# Patient Record
Sex: MALE | Race: WHITE | HISPANIC OR LATINO | Employment: UNEMPLOYED | URBAN - NONMETROPOLITAN AREA
[De-identification: names, ages, dates, MRNs, and addresses within clinical notes are randomized per-mention and may not be internally consistent; named-entity substitution may affect disease eponyms.]

---

## 2024-01-15 ENCOUNTER — APPOINTMENT (OUTPATIENT)
Dept: CT IMAGING | Facility: HOSPITAL | Age: 54
End: 2024-01-15
Payer: MEDICAID

## 2024-01-15 ENCOUNTER — HOSPITAL ENCOUNTER (INPATIENT)
Facility: HOSPITAL | Age: 54
LOS: 2 days | Discharge: HOME | End: 2024-01-17
Attending: EMERGENCY MEDICINE | Admitting: INTERNAL MEDICINE
Payer: MEDICAID

## 2024-01-15 ENCOUNTER — APPOINTMENT (OUTPATIENT)
Dept: GENERAL RADIOLOGY | Facility: HOSPITAL | Age: 54
End: 2024-01-15
Payer: MEDICAID

## 2024-01-15 DIAGNOSIS — R41.89 COGNITIVE AND BEHAVIORAL CHANGES: ICD-10-CM

## 2024-01-15 DIAGNOSIS — R53.1 ACUTE RIGHT-SIDED WEAKNESS: Primary | ICD-10-CM

## 2024-01-15 DIAGNOSIS — R46.89 COGNITIVE AND BEHAVIORAL CHANGES: ICD-10-CM

## 2024-01-15 PROBLEM — I63.9 CVA (CEREBRAL VASCULAR ACCIDENT): Status: ACTIVE | Noted: 2024-01-15

## 2024-01-15 LAB
ALBUMIN SERPL-MCNC: 4.4 G/DL (ref 3.5–5.2)
ALBUMIN/GLOB SERPL: 1.4 G/DL
ALP SERPL-CCNC: 88 U/L (ref 39–117)
ALT SERPL W P-5'-P-CCNC: 15 U/L (ref 1–41)
AMPHET+METHAMPHET UR QL: POSITIVE
AMPHETAMINES UR QL: POSITIVE
ANION GAP SERPL CALCULATED.3IONS-SCNC: 9 MMOL/L (ref 5–15)
APTT PPP: 29.2 SECONDS (ref 24.5–36)
AST SERPL-CCNC: 20 U/L (ref 1–40)
BARBITURATES UR QL SCN: NEGATIVE
BASOPHILS # BLD AUTO: 0.05 10*3/MM3 (ref 0–0.2)
BASOPHILS NFR BLD AUTO: 0.8 % (ref 0–1.5)
BENZODIAZ UR QL SCN: NEGATIVE
BILIRUB SERPL-MCNC: 0.4 MG/DL (ref 0–1.2)
BILIRUB UR QL STRIP: NEGATIVE
BUN SERPL-MCNC: 9 MG/DL (ref 6–20)
BUN/CREAT SERPL: 12.5 (ref 7–25)
BUPRENORPHINE SERPL-MCNC: NEGATIVE NG/ML
CALCIUM SPEC-SCNC: 9.6 MG/DL (ref 8.6–10.5)
CANNABINOIDS SERPL QL: NEGATIVE
CHLORIDE SERPL-SCNC: 103 MMOL/L (ref 98–107)
CHOLEST SERPL-MCNC: 160 MG/DL (ref 0–200)
CLARITY UR: CLEAR
CO2 SERPL-SCNC: 30 MMOL/L (ref 22–29)
COCAINE UR QL: NEGATIVE
COLOR UR: YELLOW
CREAT SERPL-MCNC: 0.72 MG/DL (ref 0.76–1.27)
DEPRECATED RDW RBC AUTO: 43 FL (ref 37–54)
EGFRCR SERPLBLD CKD-EPI 2021: 109.2 ML/MIN/1.73
EOSINOPHIL # BLD AUTO: 0.12 10*3/MM3 (ref 0–0.4)
EOSINOPHIL NFR BLD AUTO: 1.8 % (ref 0.3–6.2)
ERYTHROCYTE [DISTWIDTH] IN BLOOD BY AUTOMATED COUNT: 12.8 % (ref 12.3–15.4)
ETHANOL UR QL: <0.01 %
FENTANYL UR-MCNC: NEGATIVE NG/ML
GLOBULIN UR ELPH-MCNC: 3.1 GM/DL
GLUCOSE SERPL-MCNC: 84 MG/DL (ref 65–99)
GLUCOSE UR STRIP-MCNC: NEGATIVE MG/DL
HCT VFR BLD AUTO: 50.7 % (ref 37.5–51)
HDLC SERPL-MCNC: 40 MG/DL (ref 40–60)
HGB BLD-MCNC: 16.7 G/DL (ref 13–17.7)
HGB UR QL STRIP.AUTO: NEGATIVE
IMM GRANULOCYTES # BLD AUTO: 0.01 10*3/MM3 (ref 0–0.05)
IMM GRANULOCYTES NFR BLD AUTO: 0.2 % (ref 0–0.5)
INR PPP: 0.92 (ref 0.91–1.09)
KETONES UR QL STRIP: NEGATIVE
LDLC SERPL CALC-MCNC: 95 MG/DL (ref 0–100)
LDLC/HDLC SERPL: 2.29 {RATIO}
LEUKOCYTE ESTERASE UR QL STRIP.AUTO: NEGATIVE
LIPASE SERPL-CCNC: 29 U/L (ref 13–60)
LYMPHOCYTES # BLD AUTO: 1.93 10*3/MM3 (ref 0.7–3.1)
LYMPHOCYTES NFR BLD AUTO: 29 % (ref 19.6–45.3)
MAGNESIUM SERPL-MCNC: 2 MG/DL (ref 1.6–2.6)
MCH RBC QN AUTO: 30 PG (ref 26.6–33)
MCHC RBC AUTO-ENTMCNC: 32.9 G/DL (ref 31.5–35.7)
MCV RBC AUTO: 91.2 FL (ref 79–97)
METHADONE UR QL SCN: NEGATIVE
MONOCYTES # BLD AUTO: 0.56 10*3/MM3 (ref 0.1–0.9)
MONOCYTES NFR BLD AUTO: 8.4 % (ref 5–12)
NEUTROPHILS NFR BLD AUTO: 3.98 10*3/MM3 (ref 1.7–7)
NEUTROPHILS NFR BLD AUTO: 59.8 % (ref 42.7–76)
NITRITE UR QL STRIP: NEGATIVE
NRBC BLD AUTO-RTO: 0 /100 WBC (ref 0–0.2)
OPIATES UR QL: NEGATIVE
OXYCODONE UR QL SCN: NEGATIVE
PCP UR QL SCN: NEGATIVE
PH UR STRIP.AUTO: 6.5 [PH] (ref 5–8)
PLATELET # BLD AUTO: 228 10*3/MM3 (ref 140–450)
PMV BLD AUTO: 10.8 FL (ref 6–12)
POTASSIUM SERPL-SCNC: 3.5 MMOL/L (ref 3.5–5.2)
PROT SERPL-MCNC: 7.5 G/DL (ref 6–8.5)
PROT UR QL STRIP: NEGATIVE
PROTHROMBIN TIME: 12.5 SECONDS (ref 11.8–14.8)
RBC # BLD AUTO: 5.56 10*6/MM3 (ref 4.14–5.8)
SODIUM SERPL-SCNC: 142 MMOL/L (ref 136–145)
SP GR UR STRIP: 1.02 (ref 1–1.03)
T4 FREE SERPL-MCNC: 1.34 NG/DL (ref 0.93–1.7)
TRICYCLICS UR QL SCN: NEGATIVE
TRIGL SERPL-MCNC: 143 MG/DL (ref 0–150)
TROPONIN T SERPL HS-MCNC: 9 NG/L
TSH SERPL DL<=0.05 MIU/L-ACNC: 0.82 UIU/ML (ref 0.27–4.2)
UROBILINOGEN UR QL STRIP: ABNORMAL
VLDLC SERPL-MCNC: 25 MG/DL (ref 5–40)
WBC NRBC COR # BLD AUTO: 6.65 10*3/MM3 (ref 3.4–10.8)

## 2024-01-15 PROCEDURE — 70450 CT HEAD/BRAIN W/O DYE: CPT

## 2024-01-15 PROCEDURE — 85610 PROTHROMBIN TIME: CPT | Performed by: EMERGENCY MEDICINE

## 2024-01-15 PROCEDURE — 81003 URINALYSIS AUTO W/O SCOPE: CPT | Performed by: EMERGENCY MEDICINE

## 2024-01-15 PROCEDURE — 82607 VITAMIN B-12: CPT | Performed by: EMERGENCY MEDICINE

## 2024-01-15 PROCEDURE — 80053 COMPREHEN METABOLIC PANEL: CPT | Performed by: EMERGENCY MEDICINE

## 2024-01-15 PROCEDURE — 25810000003 SODIUM CHLORIDE 0.9 % SOLUTION: Performed by: EMERGENCY MEDICINE

## 2024-01-15 PROCEDURE — 83735 ASSAY OF MAGNESIUM: CPT | Performed by: EMERGENCY MEDICINE

## 2024-01-15 PROCEDURE — 80307 DRUG TEST PRSMV CHEM ANLYZR: CPT | Performed by: EMERGENCY MEDICINE

## 2024-01-15 PROCEDURE — 85730 THROMBOPLASTIN TIME PARTIAL: CPT | Performed by: EMERGENCY MEDICINE

## 2024-01-15 PROCEDURE — 80061 LIPID PANEL: CPT | Performed by: EMERGENCY MEDICINE

## 2024-01-15 PROCEDURE — 84484 ASSAY OF TROPONIN QUANT: CPT | Performed by: EMERGENCY MEDICINE

## 2024-01-15 PROCEDURE — 82077 ASSAY SPEC XCP UR&BREATH IA: CPT | Performed by: EMERGENCY MEDICINE

## 2024-01-15 PROCEDURE — 85025 COMPLETE CBC W/AUTO DIFF WBC: CPT | Performed by: EMERGENCY MEDICINE

## 2024-01-15 PROCEDURE — 73502 X-RAY EXAM HIP UNI 2-3 VIEWS: CPT

## 2024-01-15 PROCEDURE — 83690 ASSAY OF LIPASE: CPT | Performed by: EMERGENCY MEDICINE

## 2024-01-15 PROCEDURE — 84439 ASSAY OF FREE THYROXINE: CPT | Performed by: EMERGENCY MEDICINE

## 2024-01-15 PROCEDURE — 72125 CT NECK SPINE W/O DYE: CPT

## 2024-01-15 PROCEDURE — 99285 EMERGENCY DEPT VISIT HI MDM: CPT

## 2024-01-15 PROCEDURE — 84443 ASSAY THYROID STIM HORMONE: CPT | Performed by: EMERGENCY MEDICINE

## 2024-01-15 RX ORDER — ATORVASTATIN CALCIUM 40 MG/1
80 TABLET, FILM COATED ORAL NIGHTLY
Status: DISCONTINUED | OUTPATIENT
Start: 2024-01-15 | End: 2024-01-17 | Stop reason: HOSPADM

## 2024-01-15 RX ORDER — SODIUM CHLORIDE 9 MG/ML
40 INJECTION, SOLUTION INTRAVENOUS AS NEEDED
Status: DISCONTINUED | OUTPATIENT
Start: 2024-01-15 | End: 2024-01-17 | Stop reason: HOSPADM

## 2024-01-15 RX ORDER — ASPIRIN 81 MG/1
324 TABLET, CHEWABLE ORAL ONCE
Status: COMPLETED | OUTPATIENT
Start: 2024-01-15 | End: 2024-01-15

## 2024-01-15 RX ORDER — ACETAMINOPHEN 325 MG/1
650 TABLET ORAL EVERY 6 HOURS PRN
Status: DISCONTINUED | OUTPATIENT
Start: 2024-01-15 | End: 2024-01-17 | Stop reason: HOSPADM

## 2024-01-15 RX ORDER — SODIUM CHLORIDE 0.9 % (FLUSH) 0.9 %
10 SYRINGE (ML) INJECTION AS NEEDED
Status: DISCONTINUED | OUTPATIENT
Start: 2024-01-15 | End: 2024-01-17 | Stop reason: HOSPADM

## 2024-01-15 RX ORDER — SODIUM CHLORIDE 0.9 % (FLUSH) 0.9 %
10 SYRINGE (ML) INJECTION EVERY 12 HOURS SCHEDULED
Status: DISCONTINUED | OUTPATIENT
Start: 2024-01-15 | End: 2024-01-17 | Stop reason: HOSPADM

## 2024-01-15 RX ORDER — ASPIRIN 300 MG/1
300 SUPPOSITORY RECTAL DAILY
Status: DISCONTINUED | OUTPATIENT
Start: 2024-01-16 | End: 2024-01-17

## 2024-01-15 RX ORDER — ASPIRIN 325 MG
325 TABLET ORAL DAILY
Status: DISCONTINUED | OUTPATIENT
Start: 2024-01-16 | End: 2024-01-17

## 2024-01-15 RX ORDER — ONDANSETRON 2 MG/ML
4 INJECTION INTRAMUSCULAR; INTRAVENOUS EVERY 6 HOURS PRN
Status: DISCONTINUED | OUTPATIENT
Start: 2024-01-15 | End: 2024-01-17 | Stop reason: HOSPADM

## 2024-01-15 RX ADMIN — SODIUM CHLORIDE 1000 ML: 9 INJECTION, SOLUTION INTRAVENOUS at 20:41

## 2024-01-15 RX ADMIN — ASPIRIN 324 MG: 81 TABLET, CHEWABLE ORAL at 21:35

## 2024-01-16 ENCOUNTER — APPOINTMENT (OUTPATIENT)
Dept: CARDIOLOGY | Facility: HOSPITAL | Age: 54
End: 2024-01-16
Payer: MEDICAID

## 2024-01-16 ENCOUNTER — APPOINTMENT (OUTPATIENT)
Dept: CT IMAGING | Facility: HOSPITAL | Age: 54
End: 2024-01-16
Payer: MEDICAID

## 2024-01-16 ENCOUNTER — APPOINTMENT (OUTPATIENT)
Dept: MRI IMAGING | Facility: HOSPITAL | Age: 54
End: 2024-01-16
Payer: MEDICAID

## 2024-01-16 PROBLEM — E78.5 HYPERLIPIDEMIA: Status: ACTIVE | Noted: 2024-01-16

## 2024-01-16 PROBLEM — Z78.9 ALCOHOL USE: Status: ACTIVE | Noted: 2024-01-16

## 2024-01-16 PROBLEM — F15.10 METHAMPHETAMINE USE: Status: ACTIVE | Noted: 2024-01-16

## 2024-01-16 PROBLEM — Z72.0 TOBACCO USE: Status: ACTIVE | Noted: 2024-01-16

## 2024-01-16 LAB
BH CV ECHO MEAS - AO MAX PG: 4.6 MMHG
BH CV ECHO MEAS - AO MEAN PG: 2.5 MMHG
BH CV ECHO MEAS - AO ROOT DIAM: 3.3 CM
BH CV ECHO MEAS - AO V2 MAX: 107 CM/SEC
BH CV ECHO MEAS - AO V2 VTI: 24.8 CM
BH CV ECHO MEAS - AVA(I,D): 2.6 CM2
BH CV ECHO MEAS - EDV(CUBED): 97.3 ML
BH CV ECHO MEAS - EDV(MOD-SP2): 100 ML
BH CV ECHO MEAS - EDV(MOD-SP4): 117 ML
BH CV ECHO MEAS - EF(MOD-BP): 54.2 %
BH CV ECHO MEAS - EF(MOD-SP2): 56.9 %
BH CV ECHO MEAS - EF(MOD-SP4): 51 %
BH CV ECHO MEAS - ESV(CUBED): 46.7 ML
BH CV ECHO MEAS - ESV(MOD-SP2): 43.1 ML
BH CV ECHO MEAS - ESV(MOD-SP4): 57.3 ML
BH CV ECHO MEAS - FS: 21.7 %
BH CV ECHO MEAS - IVS/LVPW: 1.18 CM
BH CV ECHO MEAS - IVSD: 1.3 CM
BH CV ECHO MEAS - LA DIMENSION: 3 CM
BH CV ECHO MEAS - LAT PEAK E' VEL: 6.6 CM/SEC
BH CV ECHO MEAS - LV DIASTOLIC VOL/BSA (35-75): 64.9 CM2
BH CV ECHO MEAS - LV MASS(C)D: 205 GRAMS
BH CV ECHO MEAS - LV MAX PG: 2 MMHG
BH CV ECHO MEAS - LV MEAN PG: 1 MMHG
BH CV ECHO MEAS - LV SYSTOLIC VOL/BSA (12-30): 31.8 CM2
BH CV ECHO MEAS - LV V1 MAX: 70.7 CM/SEC
BH CV ECHO MEAS - LV V1 VTI: 15.5 CM
BH CV ECHO MEAS - LVIDD: 4.6 CM
BH CV ECHO MEAS - LVIDS: 3.6 CM
BH CV ECHO MEAS - LVOT AREA: 4.2 CM2
BH CV ECHO MEAS - LVOT DIAM: 2.3 CM
BH CV ECHO MEAS - LVPWD: 1.1 CM
BH CV ECHO MEAS - MED PEAK E' VEL: 3.8 CM/SEC
BH CV ECHO MEAS - MV A MAX VEL: 89.4 CM/SEC
BH CV ECHO MEAS - MV DEC TIME: 0.35 SEC
BH CV ECHO MEAS - MV E MAX VEL: 63.7 CM/SEC
BH CV ECHO MEAS - MV E/A: 0.71
BH CV ECHO MEAS - PI END-D VEL: 94.6 CM/SEC
BH CV ECHO MEAS - SI(MOD-SP2): 31.6 ML/M2
BH CV ECHO MEAS - SI(MOD-SP4): 33.1 ML/M2
BH CV ECHO MEAS - SV(LVOT): 64.4 ML
BH CV ECHO MEAS - SV(MOD-SP2): 56.9 ML
BH CV ECHO MEAS - SV(MOD-SP4): 59.7 ML
BH CV ECHO MEASUREMENTS AVERAGE E/E' RATIO: 12.25
BH CV ECHO SHUNT ASSESSMENT PERFORMED (HIDDEN SCRIPTING): 1
GLUCOSE BLDC GLUCOMTR-MCNC: 130 MG/DL (ref 70–130)
GLUCOSE BLDC GLUCOMTR-MCNC: 198 MG/DL (ref 70–130)
GLUCOSE BLDC GLUCOMTR-MCNC: 342 MG/DL (ref 70–130)
GLUCOSE BLDC GLUCOMTR-MCNC: 77 MG/DL (ref 70–130)
GLUCOSE BLDC GLUCOMTR-MCNC: 90 MG/DL (ref 70–130)
HBA1C MFR BLD: 5.6 % (ref 4.8–5.6)
LEFT ATRIUM VOLUME INDEX: 18.8 ML/M2
LEFT ATRIUM VOLUME: 33.8 ML
VIT B12 BLD-MCNC: 507 PG/ML (ref 211–946)

## 2024-01-16 PROCEDURE — 93306 TTE W/DOPPLER COMPLETE: CPT | Performed by: EMERGENCY MEDICINE

## 2024-01-16 PROCEDURE — 70551 MRI BRAIN STEM W/O DYE: CPT

## 2024-01-16 PROCEDURE — 82948 REAGENT STRIP/BLOOD GLUCOSE: CPT

## 2024-01-16 PROCEDURE — 93306 TTE W/DOPPLER COMPLETE: CPT

## 2024-01-16 PROCEDURE — 70498 CT ANGIOGRAPHY NECK: CPT

## 2024-01-16 PROCEDURE — 83036 HEMOGLOBIN GLYCOSYLATED A1C: CPT | Performed by: INTERNAL MEDICINE

## 2024-01-16 PROCEDURE — 92523 SPEECH SOUND LANG COMPREHEN: CPT | Performed by: SPEECH-LANGUAGE PATHOLOGIST

## 2024-01-16 PROCEDURE — 25010000002 THIAMINE PER 100 MG: Performed by: EMERGENCY MEDICINE

## 2024-01-16 PROCEDURE — 99221 1ST HOSP IP/OBS SF/LOW 40: CPT | Performed by: CLINICAL NURSE SPECIALIST

## 2024-01-16 PROCEDURE — 25510000001 IOPAMIDOL PER 1 ML: Performed by: INTERNAL MEDICINE

## 2024-01-16 PROCEDURE — 70496 CT ANGIOGRAPHY HEAD: CPT

## 2024-01-16 RX ORDER — LOSARTAN POTASSIUM 50 MG/1
25 TABLET ORAL
Status: DISCONTINUED | OUTPATIENT
Start: 2024-01-16 | End: 2024-01-17 | Stop reason: HOSPADM

## 2024-01-16 RX ADMIN — ASPIRIN 325 MG: 325 TABLET, FILM COATED ORAL at 09:17

## 2024-01-16 RX ADMIN — Medication 10 ML: at 09:17

## 2024-01-16 RX ADMIN — THIAMINE HYDROCHLORIDE 100 MG: 100 INJECTION, SOLUTION INTRAMUSCULAR; INTRAVENOUS at 09:17

## 2024-01-16 RX ADMIN — Medication 10 ML: at 21:03

## 2024-01-16 RX ADMIN — IOPAMIDOL 90 ML: 755 INJECTION, SOLUTION INTRAVENOUS at 17:16

## 2024-01-16 RX ADMIN — FOLIC ACID 1 MG: 5 INJECTION, SOLUTION INTRAMUSCULAR; INTRAVENOUS; SUBCUTANEOUS at 09:17

## 2024-01-16 RX ADMIN — LOSARTAN POTASSIUM 25 MG: 50 TABLET, FILM COATED ORAL at 17:54

## 2024-01-16 RX ADMIN — ATORVASTATIN CALCIUM 80 MG: 40 TABLET, FILM COATED ORAL at 21:03

## 2024-01-16 RX ADMIN — Medication 10 ML: at 00:21

## 2024-01-16 RX ADMIN — ATORVASTATIN CALCIUM 80 MG: 40 TABLET, FILM COATED ORAL at 00:21

## 2024-01-16 NOTE — PLAN OF CARE
Goal Outcome Evaluation:  Plan of Care Reviewed With: patient        Progress: no change (eval)  Outcome Evaluation: ST speech/language/cognitive evaluation completed. Pt admitted for acute stroke of right basal ganglia. Swallow screen passed and pt started on PO diet. Pt able to answer questions appropriately and in a timely manner. Alert and oriented x4. No trouble following commands or problem solving noted. Pt reports feeling baseline at this time in regards to communication/cognition. ST services no longer warranted. MD to reconsult if change or new concern.      Anticipated Discharge Disposition (SLP): home    SLP Diagnosis: functional speech/language skills, functional cognitive-linguistic skills (01/16/24 0629)

## 2024-01-16 NOTE — PROGRESS NOTES
[facility] Medicine Services  INPATIENT PROGRESS NOTE    Patient Name: Jermaine Howard  Date of Admission: 1/15/2024  Today's Date: 01/16/24  Length of Stay: 1  Primary Care Physician: Provider, No Known    Subjective   Chief Complaint: Right arm, right leg weakness  HPI   Mr. Howard presented to [facility] emergency room 1/15/2024 reporting unable to stand with right arm and right lower extremity weakness.  Patient reported 1/13/2024 he was working on remodeling home and noted right arm and right leg weakness around 2300 while in the shower.  He reported sudden onset of right leg weakness and fell to the floor.  He reported difficulty getting up.  In addition, he noted right arm weakness.  He denied speech difficulty, facial weakness or altered speech.  Reported initially he was having to use his left hand to move his right arm.  On examination he was noted to have right upper extremity right lower extremity weakness with discoordination of the right upper extremity.  CT head and neck no acute intracranial abnormality.  X-ray pelvis no acute abnormality.  CT cervical spine no acute bony abnormality.  Normal saline fluid bolus, folic acid, aspirin, statin given in ER    Today  Patient seen earlier while remaining in the emergency room due to no beds available on the floor.  Kniffen other in room.  He has difficulty with hand eye coordination right hand.  Right upper extremity and right lower extremity weakness.  No word finding issues.  No facial asymmetry.  No complaints of headache, blurred vision.  Denies nausea, vomiting or abdominal pain.  MRI of the brain shows acute left corona radiata infarct.  Punctate focus of susceptibility artifact right basal ganglia and left thalamus likely remote microhemorrhages.  Echo no evidence of PFO.  No evidence of atrial septal DVT effect.  Saline test negative for right-to-left shunt.  CTA head and carotids pending.      Review  of Systems   Constitutional:  Positive for activity change. Negative for fatigue.   HENT:  Negative for congestion and trouble swallowing.    Eyes:  Negative for photophobia and visual disturbance.   Respiratory:  Negative for cough, shortness of breath and wheezing.    Cardiovascular:  Negative for chest pain, palpitations and leg swelling.   Gastrointestinal:  Negative for constipation, diarrhea, nausea and vomiting.   Endocrine: Negative for cold intolerance, heat intolerance and polyuria.   Genitourinary:  Negative for dysuria, frequency and urgency.   Musculoskeletal:  Positive for gait problem (Right lower extremity weakness, right upper extremity weakness).   Skin:  Negative for color change, pallor, rash and wound.   Allergic/Immunologic: Negative for immunocompromised state.   Neurological:  Positive for weakness (Right upper extremity, right lower extremity, fine motor control right hand.).   Hematological:  Negative for adenopathy. Does not bruise/bleed easily.   Psychiatric/Behavioral:  Negative for agitation, behavioral problems and confusion.       All pertinent negatives and positives are as above. All other systems have been reviewed and are negative unless otherwise stated.     Objective    Temp:  [97.6 °F (36.4 °C)-97.9 °F (36.6 °C)] 97.9 °F (36.6 °C)  Heart Rate:  [55-87] 67  Resp:  [18-21] 18  BP: (132-184)/() 144/79  Physical Exam  Vitals and nursing note reviewed.   HENT:      Head: Normocephalic and atraumatic.      Nose: No congestion.      Mouth/Throat:      Pharynx: Oropharynx is clear. No oropharyngeal exudate or posterior oropharyngeal erythema.   Eyes:      Extraocular Movements: Extraocular movements intact.      Pupils: Pupils are equal, round, and reactive to light.   Cardiovascular:      Rate and Rhythm: Normal rate and regular rhythm.      Heart sounds: No murmur heard.     Comments: Normal sinus rhythm 66 on bedside monitor.  Pulmonary:      Breath sounds: No wheezing or  "rhonchi.      Comments: No oxygen use.  Abdominal:      Palpations: Abdomen is soft.      Tenderness: There is no abdominal tenderness.   Genitourinary:     Comments: Voiding.  Musculoskeletal:         General: No swelling or tenderness.      Cervical back: Normal range of motion and neck supple.   Skin:     General: Skin is warm and dry.   Neurological:      Mental Status: He is alert.   Psychiatric:         Mood and Affect: Mood normal.         Behavior: Behavior normal.         Thought Content: Thought content normal.         Judgment: Judgment normal.       Results Review:  I have reviewed the labs, radiology results, and diagnostic studies.    Laboratory Data:   Results from last 7 days   Lab Units 01/15/24  2018   WBC 10*3/mm3 6.65   HEMOGLOBIN g/dL 16.7   HEMATOCRIT % 50.7   PLATELETS 10*3/mm3 228        Results from last 7 days   Lab Units 01/15/24  2018   SODIUM mmol/L 142   POTASSIUM mmol/L 3.5   CHLORIDE mmol/L 103   CO2 mmol/L 30.0*   BUN mg/dL 9   CREATININE mg/dL 0.72*   CALCIUM mg/dL 9.6   BILIRUBIN mg/dL 0.4   ALK PHOS U/L 88   ALT (SGPT) U/L 15   AST (SGOT) U/L 20   GLUCOSE mg/dL 84       Culture Data:   No results found for: \"BLOODCX\", \"URINECX\", \"WOUNDCX\", \"MRSACX\", \"RESPCX\", \"STOOLCX\"    Radiology Data:   Imaging Results (Last 24 Hours)       Procedure Component Value Units Date/Time    MRI Brain Without Contrast [[id number]] Collected: 01/16/24 1116     Updated: 01/16/24 1129    Narrative:      Exam: MRI BRAIN WO CONTRAST- 1/16/2024 9:40 AM     History: CVA; R53.1-Weakness     Technique: Multisequence, multiplanar MRI of the brain was performed  without intravenous contrast.      Contrast: None.     Comparison: CT head 1/15/2024     Findings:      Acute infarct involving the left corona radiata. Minimal associated  vasogenic edema without significant mass effect or hemorrhagic  conversion.     Small left centrum semiovale old lacunar infarct. Presumed remote right  basal ganglia lacunar infarct. " Mild presumed superimposed chronic small  vessel ischemic changes. Small focus of susceptibility artifact  involving the right basal ganglia and left thalamus.     Ventricles and extra-axial CSF spaces are normal in size. Major vascular  flow voids are preserved.     Sella/parasellar structures are grossly unremarkable. No tonsillar  ectopia.     Orbits are grossly unremarkable. Minimal right maxillary sinus mucosal  thickening. Mastoid air cells are grossly clear.     No suspicious calvarial or extracranial soft tissue abnormality.       Impression:      Impression:     Acute left corona radiata small infarct. No significant mass effect or  hemorrhagic conversion.     Additional old left corona radiata and right basal ganglia presumed  lacunar infarcts with mild chronic small vessel ischemic changes.     Punctate focus of susceptibility artifact in the right basal ganglia and  left thalamus, likely from remote microhemorrhages, possibly  hypertensive related.        This report was signed and finalized on 1/16/2024 11:26 AM by Braden Perez.       CT Cervical Spine Without Contrast [[id number]] Collected: 01/15/24 2029     Updated: 01/15/24 2033    Narrative:      EXAMINATION: CT CERVICAL SPINE WO CONTRAST-      1/15/2024 7:13 PM     HISTORY: Fall injury. Neck pain.     In order to have a CT radiation dose as low as reasonably achievable  Automated Exposure Control was utilized for adjustment of the mA and/or  KV according to patient size.     CT Dose DLP = 1160.14 mGy.cm.  (If there are multiple studies performed at the same time this  represents the total dose).     Axial, sagittal, and coronal noncontrast CT imaging.     Vertebral bodies and posterior elements are intact.     Reconstructed sagittal images shows straightening of the normal lordotic  curvature.   There is no malalignment. Prevertebral soft tissues are normal.   Facet joints align normally.     Reconstructed coronal images show normal lateral mass  alignment.       Impression:      1. No acute bony abnormality.           This report was signed and finalized on 1/15/2024 8:30 PM by Dr. Heber Saravia MD.       XR Hip With or Without Pelvis 2 - 3 View Right [[id number]] Collected: 01/15/24 2028     Updated: 01/15/24 2032    Narrative:      EXAMINATION: XR HIP W OR WO PELVIS 2-3 VIEW RIGHT-     1/15/2024 7:24 PM     HISTORY: Fall injury. RIGHT hip pain     COMPARISON:  None.     Pelvis and RIGHT hip, 3 views.     The pelvic ring is intact.  No pubic symphysis or sacroiliac joint diastasis.     The proximal RIGHT femur and acetabulum are intact.     Normal appearance of the hip joint space.     Soft tissues are appropriate.     Summary:     1. No acute bony abnormality is seen.           This report was signed and finalized on 1/15/2024 8:29 PM by Dr. Heber Saravia MD.       CT Head Without Contrast [[id number]] Collected: 01/15/24 2027     Updated: 01/15/24 2031    Narrative:      EXAMINATION: CT HEAD WO CONTRAST-      1/15/2024 7:13 PM     HISTORY: Fall injury. RIGHT side weakness.     In order to have a CT radiation dose as low as reasonably achievable  Automated Exposure Control was utilized for adjustment of the mA and/or  KV according to patient size.     CT Dose DLP = 1160.14 mGy.cm.  (If there are multiple studies performed at the same time this  represents the total dose).     Axial, sagittal, and coronal noncontrast CT imaging of the head.     The visualized paranasal sinuses are clear.     The brain and ventricles have an age appropriate appearance.   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  There is no hemorrhage or mass-effect.   No acute infarction is seen.     No calvarial abnormality.       Impression:      1. No acute intracranial abnormality is seen.           This report was signed and finalized on 1/15/2024 8:28 PM by Dr. Heber Saravia MD.             Scheduled  medications:  aspirin, 325 mg, Oral, Daily   Or  aspirin, 300 mg, Rectal, Daily  atorvastatin, 80 mg, Oral, Nightly  folic acid 1 mg in sodium chloride 0.9 % 50 mL IVPB, 1 mg, Intravenous, Daily  losartan, 25 mg, Oral, Q24H  sodium chloride, 10 mL, Intravenous, Q12H  thiamine (B-1) 100 mg in sodium chloride 0.9 % 100 mL IVPB, 100 mg, Intravenous, Daily       I have reviewed the patient's current medications.     Assessment/Plan   Assessment  Active Hospital Problems    Diagnosis     **Acute stroke of right basal ganglia     Tobacco use     Alcohol use     Methamphetamine use     Hyperlipidemia, LDL 95        Treatment Plan    1.  Acute right basal ganglia stroke.  Presented with right arm, right leg weakness.  MRI right basal ganglia stroke.  Echo EF 51-55%.  No PFO.  No evidence of atrial septal defect.  Saline test negative for right-to-left atrial shunt.  CTA head and carotids pending.  Neurology consulted.  Discussed with ALLYSON Brown today.  Aspirin 325 mg orally daily.  Lipitor 80 mg nightly.  SBP goal less than 140 as patient is 3 to 4 days from symptoms.    Physical therapy, Occupational Therapy, speech therapy consulted.  Patient at baseline with communication and cognition.  No ST services needed.    2.  Hyperlipidemia.  LDL 95 Lipitor 80 mg orally nightly.  Hemoglobin A1c 5.6    3.  Hypertension.  Blood pressure 172/103 on admission.  Trended down to 144/79.  Per neurology SBP goal less than 140 now that 3 to 4 days from symptom onset.  Start losartan 25 mg orally daily    4.  Tobacco use.  Discussed smoking cessation    5.  Methamphetamine use.  Discussed methamphetamine discontinuation.    6.  Alcohol use.  Patient admits to 2 drinks whiskey daily.  No signs of shaking or withdrawal.  Prophylactic folic acid and thiamine.    7.  SCDs for deep vein thrombosis prophylaxis    8.  Patient has no primary care provider.  Will need to establish care with local MD at discharge.  Follow-up with neurology 4  weeks.      Medical Decision Making  Number and Complexity of problems: 5  Acute right basal ganglia stroke: Acute, high complexity poses threat to life and bodily function  Hyperlipidemia: Chronic, moderate complexity, not at baseline  Tobacco use: Chronic, moderate complexity  Methamphetamine use: Chronic, moderate complexity  Alcohol use: Chronic, moderate/high complexity.    Differential Diagnosis: None    Conditions and Status        Condition is improving.     [facility] Data  External documents reviewed: None available  Cardiac tracing (EKG, telemetry) interpretation: Sinus rhythm 66  Radiology interpretation: Reviewed radiology interpretation MRI of the brain, CT scan of the head, x-ray hips, CT cervical spine  Labs reviewed:   CMP 1/15/2024.  Repeat CMP in AM.  CBC 1/15/2024.  Repeat CBC in AM.  Hemoglobin A1c, lipid panel  Urine drug screen    Any tests that were considered but not ordered: None     Decision rules/scores evaluated (example [id number], [place], etc): None     Discussed with: Mary Quiroz, ALLYSON neurology, Dr. Strong neurology, patient, and significant other present at bedside.     Care Planning  Shared decision making: Mary Quiroz, ALLYSON neurology, Dr. Strong neurology, patient, and significant other present at bedside.  Patient agrees to neurology consult, CTA carotids, head.  Increase to aspirin, statin, discussed smoking cessation, alcohol cessation.  Code status and discussions: Full code  The patient surrogate decision maker is Linda Davis  Social Determinants of Health that impact treatment or disposition: None  I expect the patient to be discharged to home in 1-2 days.     Electronically signed by ALLYSON Fregoso, 01/16/24, 16:45 CST.     The above documentation resulted from a face-to-face encounter by me Analy VALADEZ, [facility].

## 2024-01-16 NOTE — H&P
[facility] Medicine Services  HISTORY AND PHYSICAL    Date of Admission: 1/15/2024  Primary Care Physician: Provider, No Known    Subjective   Primary Historian: Patient     Chief Complaint:     Hip Pain     Arm Pain   Pertinent negatives include no chest pain.   53-year-old male who presents emergency department because he is unable to stand up.  He got dizzy yesterday and fell.  His symptoms started yesterday at 11 PM.  He states he got in the shower and when he was in the shower around 11 his right leg gave out on him.  He got out of the shower and went to bed, and has been in bed since that time.  He is unable to move his right leg.  When he tries to stand on it the leg will just give out.  He does note chronic back pain.  He notes no sensation changes.  He is able to give history without any difficulty.  I do not notice any dysarthria.  He states he is very fearful of doctors and hospitals, and does not follow regularly with a physician.  On exam, it is noted that the patient has right upper extremity and right lower extremity weakness.  He has discoordination with the right upper extremity.  He does not seem to have any sensation loss.        Review of Systems   Constitutional:  Negative for chills and fever.   Respiratory:  Negative for cough and shortness of breath.    Cardiovascular:  Negative for chest pain and leg swelling.   Gastrointestinal:  Negative for constipation, diarrhea and nausea.   Musculoskeletal:  Positive for arthralgias and gait problem.      Otherwise complete ROS reviewed and negative except as mentioned in the HPI.    Past Medical History: History reviewed. No pertinent past medical history.    Past Surgical History:History reviewed. No pertinent surgical history.    Social History:  reports that he has been smoking cigarettes. He has been smoking an average of 1 pack per day. He does not have any smokeless tobacco history on file. He reports current  "alcohol use. He reports that he does not currently use drugs after having used the following drugs: Methamphetamines.    Family History: family history is not on file.       Allergies:  No Known Allergies    Medications:  Prior to Admission medications    Not on File     I have utilized all available immediate resources to obtain, update, or review the patient's current medications (including all prescriptions, over-the-counter products, herbals, cannabis/cannabidiol products, and vitamin/mineral/dietary (nutritional) supplements).    Objective     Vital Signs: BP (!) 184/102   Pulse 76   Temp 97.7 °F (36.5 °C) (Oral)   Resp 20   Ht 162.6 cm (64\")   Wt 74.8 kg (165 lb)   SpO2 98%   BMI 28.32 kg/m²   Physical Exam  Constitutional:       Appearance: He is not ill-appearing.   HENT:      Head: Normocephalic and atraumatic.      Comments: Poor dentition      Right Ear: External ear normal.      Left Ear: External ear normal.      Nose: Nose normal. No congestion.   Eyes:      General: No scleral icterus.     Extraocular Movements: Extraocular movements intact.   Cardiovascular:      Rate and Rhythm: Normal rate and regular rhythm.   Pulmonary:      Effort: Pulmonary effort is normal. No respiratory distress.   Abdominal:      General: Abdomen is flat. There is no distension.   Musculoskeletal:         General: No swelling. Normal range of motion.      Comments: Right upper and lower extremity weakness.    Skin:     General: Skin is warm.      Coloration: Skin is not jaundiced.   Neurological:      Mental Status: He is alert and oriented to person, place, and time.      Cranial Nerves: No cranial nerve deficit.      Sensory: No sensory deficit.      Motor: Weakness present.      Coordination: Coordination abnormal.      Gait: Gait abnormal.   Psychiatric:         Mood and Affect: Mood normal.         Behavior: Behavior normal.          Results Reviewed:  Lab Results (last 24 hours)       Procedure Component Value " Units Date/Time    Vitamin B12 [[id number]] Collected: 01/15/24 2122    Specimen: Blood Updated: 01/15/24 2127    Comprehensive Metabolic Panel [[id number]]  (Abnormal) Collected: 01/15/24 2018    Specimen: Blood Updated: 01/15/24 2052     Glucose 84 mg/dL      BUN 9 mg/dL      Creatinine 0.72 mg/dL      Sodium 142 mmol/L      Potassium 3.5 mmol/L      Chloride 103 mmol/L      CO2 30.0 mmol/L      Calcium 9.6 mg/dL      Total Protein 7.5 g/dL      Albumin 4.4 g/dL      ALT (SGPT) 15 U/L      AST (SGOT) 20 U/L      Alkaline Phosphatase 88 U/L      Total Bilirubin 0.4 mg/dL      Globulin 3.1 gm/dL      A/G Ratio 1.4 g/dL      BUN/Creatinine Ratio 12.5     Anion Gap 9.0 mmol/L      eGFR 109.2 mL/min/1.73     Narrative:      GFR Normal >60  Chronic Kidney Disease <60  Kidney Failure <15      Lipase [[id number]]  (Normal) Collected: 01/15/24 2018    Specimen: Blood Updated: 01/15/24 2052     Lipase 29 U/L     Single High Sensitivity Troponin T [[id number]]  (Normal) Collected: 01/15/24 2018    Specimen: Blood Updated: 01/15/24 2052     HS Troponin T 9 ng/L     Narrative:      High Sensitive Troponin T Reference Range:  <14.0 ng/L- Negative Female for AMI  <22.0 ng/L- Negative Male for AMI  >=14 - Abnormal Female indicating possible myocardial injury.  >=22 - Abnormal Male indicating possible myocardial injury.   Clinicians would have to utilize clinical acumen, EKG, Troponin, and serial changes to determine if it is an Acute Myocardial Infarction or myocardial injury due to an underlying chronic condition.         Ethanol [[id number]] Collected: 01/15/24 2018    Specimen: Blood Updated: 01/15/24 2052     Ethanol % <0.010 %     Narrative:      Not for legal purposes. Chain of Custody not followed.     TSH [[id number]]  (Normal) Collected: 01/15/24 2018    Specimen: Blood Updated: 01/15/24 2052     TSH 0.819 uIU/mL     T4, Free [[id number]]  (Normal) Collected: 01/15/24 2018    Specimen: Blood Updated: 01/15/24 2052     Free T4  1.34 ng/dL     Narrative:      Results may be falsely increased if patient taking Biotin.      Magnesium [[id number]]  (Normal) Collected: 01/15/24 2018    Specimen: Blood Updated: 01/15/24 2052     Magnesium 2.0 mg/dL     Lipid Panel [[id number]] Collected: 01/15/24 2018    Specimen: Blood Updated: 01/15/24 2052     Total Cholesterol 160 mg/dL      Triglycerides 143 mg/dL      HDL Cholesterol 40 mg/dL      LDL Cholesterol  95 mg/dL      VLDL Cholesterol 25 mg/dL      LDL/HDL Ratio 2.29    Narrative:      Cholesterol Reference Ranges  (U.S. Department of Health and Human Services ATP III Classifications)    Desirable          <200 mg/dL  Borderline High    200-239 mg/dL  High Risk          >240 mg/dL      Triglyceride Reference Ranges  (U.S. Department of Health and Human Services ATP III Classifications)    Normal           <150 mg/dL  Borderline High  150-199 mg/dL  High             200-499 mg/dL  Very High        >500 mg/dL    HDL Reference Ranges  (U.S. Department of Health and Human Services ATP III Classifications)    Low     <40 mg/dl (major risk factor for CHD)  High    >60 mg/dl ('negative' risk factor for CHD)        LDL Reference Ranges  (U.S. Department of Health and Human Services ATP III Classifications)    Optimal          <100 mg/dL  Near Optimal     100-129 mg/dL  Borderline High  130-159 mg/dL  High             160-189 mg/dL  Very High        >189 mg/dL    Protime-INR [[id number]]  (Normal) Collected: 01/15/24 2018    Specimen: Blood Updated: 01/15/24 2043     Protime 12.5 Seconds      INR 0.92    aPTT [[id number]]  (Normal) Collected: 01/15/24 2018    Specimen: Blood Updated: 01/15/24 2043     PTT 29.2 seconds     CBC & Differential [[id number]]  (Normal) Collected: 01/15/24 2018    Specimen: Blood Updated: 01/15/24 2026    Narrative:      The following orders were created for panel order CBC & Differential.  Procedure                               Abnormality         Status                      ---------                               -----------         ------                     CBC Auto Differential[[id number]]        Normal              Final result                 Please view results for these tests on the individual orders.    CBC Auto Differential [[id number]]  (Normal) Collected: 01/15/24 2018    Specimen: Blood Updated: 01/15/24 2026     WBC 6.65 10*3/mm3      RBC 5.56 10*6/mm3      Hemoglobin 16.7 g/dL      Hematocrit 50.7 %      MCV 91.2 fL      MCH 30.0 pg      MCHC 32.9 g/dL      RDW 12.8 %      RDW-SD 43.0 fl      MPV 10.8 fL      Platelets 228 10*3/mm3      Neutrophil % 59.8 %      Lymphocyte % 29.0 %      Monocyte % 8.4 %      Eosinophil % 1.8 %      Basophil % 0.8 %      Immature Grans % 0.2 %      Neutrophils, Absolute 3.98 10*3/mm3      Lymphocytes, Absolute 1.93 10*3/mm3      Monocytes, Absolute 0.56 10*3/mm3      Eosinophils, Absolute 0.12 10*3/mm3      Basophils, Absolute 0.05 10*3/mm3      Immature Grans, Absolute 0.01 10*3/mm3      nRBC 0.0 /100 WBC           Imaging Results (Last 24 Hours)       Procedure Component Value Units Date/Time    CT Cervical Spine Without Contrast [[id number]] Collected: 01/15/24 2029     Updated: 01/15/24 2033    Narrative:      EXAMINATION: CT CERVICAL SPINE WO CONTRAST-      1/15/2024 7:13 PM     HISTORY: Fall injury. Neck pain.     In order to have a CT radiation dose as low as reasonably achievable  Automated Exposure Control was utilized for adjustment of the mA and/or  KV according to patient size.     CT Dose DLP = 1160.14 mGy.cm.  (If there are multiple studies performed at the same time this  represents the total dose).     Axial, sagittal, and coronal noncontrast CT imaging.     Vertebral bodies and posterior elements are intact.     Reconstructed sagittal images shows straightening of the normal lordotic  curvature.   There is no malalignment. Prevertebral soft tissues are normal.   Facet joints align normally.     Reconstructed coronal images  show normal lateral mass alignment.       Impression:      1. No acute bony abnormality.           This report was signed and finalized on 1/15/2024 8:30 PM by Dr. Heber Saravia MD.       XR Hip With or Without Pelvis 2 - 3 View Right [[id number]] Collected: 01/15/24 2028     Updated: 01/15/24 2032    Narrative:      EXAMINATION: XR HIP W OR WO PELVIS 2-3 VIEW RIGHT-     1/15/2024 7:24 PM     HISTORY: Fall injury. RIGHT hip pain     COMPARISON:  None.     Pelvis and RIGHT hip, 3 views.     The pelvic ring is intact.  No pubic symphysis or sacroiliac joint diastasis.     The proximal RIGHT femur and acetabulum are intact.     Normal appearance of the hip joint space.     Soft tissues are appropriate.     Summary:     1. No acute bony abnormality is seen.           This report was signed and finalized on 1/15/2024 8:29 PM by Dr. Heber Saravia MD.       CT Head Without Contrast [[id number]] Collected: 01/15/24 2027     Updated: 01/15/24 2031    Narrative:      EXAMINATION: CT HEAD WO CONTRAST-      1/15/2024 7:13 PM     HISTORY: Fall injury. RIGHT side weakness.     In order to have a CT radiation dose as low as reasonably achievable  Automated Exposure Control was utilized for adjustment of the mA and/or  KV according to patient size.     CT Dose DLP = 1160.14 mGy.cm.  (If there are multiple studies performed at the same time this  represents the total dose).     Axial, sagittal, and coronal noncontrast CT imaging of the head.     The visualized paranasal sinuses are clear.     The brain and ventricles have an age appropriate appearance.   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  There is no hemorrhage or mass-effect.   No acute infarction is seen.     No calvarial abnormality.       Impression:      1. No acute intracranial abnormality is seen.           This report was signed and finalized on 1/15/2024 8:28 PM by Dr. Heber Saravia MD.              I have personally reviewed and interpreted the radiology studies and ECG obtained at time of admission.     Assessment / Plan   Assessment:   Active Hospital Problems    Diagnosis     **CVA (cerebral vascular accident)      Impression:  CVA  Hypertension  Chronic low back pain  Poor dentition    Treatment Plan  Stroke protocol orders  Follow stroke protocol orders for elevated blood pressure  Fall precautions  MRI of the brain in the morning  Neurology consult  CTA of the carotids  Cardiac echo  8.   Follow-up labs in the morning    The patient will be admitted to my service here at [facility].  Primary team to take over in the morning    Medical Decision Making  Number and Complexity of problems: 4, moderate  Differential Diagnosis: Lumbar stenosis    Conditions and Status        Condition is unchanged.     [facility] Data  External documents reviewed: None  Cardiac tracing (EKG, telemetry) interpretation: None  Radiology interpretation: None  Labs reviewed: Reviewed  Any tests that were considered but not ordered: None     Decision rules/scores evaluated (example [id number], Wells, etc): None     Discussed with: Patient and family at bedside     Care Planning  Shared decision making: Patient, family, and ED staff  Code status and discussions: Full    Disposition  Social Determinants of Health that impact treatment or disposition: None  Estimated length of stay is overnight.     I confirmed that the patient's advanced care plan is present, code status is documented, and a surrogate decision maker is listed in the patient's medical record.     The patient's surrogate decision maker is family.     The patient was seen and examined by me on 1/15/2024 at 9.    Electronically signed by Manisha Painting DO, 01/15/24, 21:33 CST.

## 2024-01-16 NOTE — CONSULTS
Neurology Consult Note    Consult Date: 2024  Referring MD: No ref. provider found  Reason for Consult: stroke     Patient: Jermaine Howard (53 y.o. male)  MRN: [id number]  : 1970    History of Present Illness:   Jermaine Howard is a 53 y.o. male right handed with no significant medical history and does not follow with PCP. He does admit to 1ppd tobacco use for 20 years, 20 years of EOTH use of mixed drinks, and methamphetamine use but had gone to rehab and uses meth periodically. He denies IV drug use. History is obtained by patient and his significant other who is at the bedside. Patient seen along with Dr. LETICIA Gomez.     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 He had difficulty getting up. He did notice right arm mildly weak as well. He and SO denies facial weakness or altered speech. Patient presented to [facility] ED on 1/15/2024 as he was not improving. CT head showed no acute process. CT CS no acute bony abnormality. Patient admitted for further evaluation. MRI brain done this AM shows acute stroke left corona radiata consistent with patient presentation. TTE done shows no PFO or ASD and no structural abnormality other than trace aortic valve regurgitation. He has noted some improvement of right arm and leg since admission. IV TNK not considered as patient presented 3 days after onset of symptoms.       Medical History:   Past Medical/Surgical Hx:  History reviewed. No pertinent past medical history.  History reviewed. No pertinent surgical history.    Medications On Admission:  (Not in a hospital admission)      Current Medications:    Current Facility-Administered Medications:     acetaminophen (TYLENOL) tablet 650 mg, 650 mg, Oral, Q6H PRN, Manisha Painting,     aspirin tablet 325 mg, 325 mg, Oral, Daily, 325 mg at  01/16/24 0917 **OR** aspirin suppository 300 mg, 300 mg, Rectal, Daily, Manisha Painting DO    atorvastatin (LIPITOR) tablet 80 mg, 80 mg, Oral, Nightly, Manisha Painting DO, 80 mg at 01/16/24 0021    folic acid 1 mg in sodium chloride 0.9 % 50 mL IVPB, 1 mg, Intravenous, Daily, AndersonstAram shine DO, Last Rate: 100 mL/hr at 01/16/24 0917, 1 mg at 01/16/24 0917    ondansetron (ZOFRAN) injection 4 mg, 4 mg, Intravenous, Q6H PRN, Manisha Painting DO    sodium chloride 0.9 % flush 10 mL, 10 mL, Intravenous, Q12H, Manisha Painting DO, 10 mL at 01/16/24 0917    sodium chloride 0.9 % flush 10 mL, 10 mL, Intravenous, PRN, Manisha Painting DO    sodium chloride 0.9 % infusion 40 mL, 40 mL, Intravenous, PRN, Manisha Painting DO    thiamine (B-1) 100 mg in sodium chloride 0.9 % 100 mL IVPB, 100 mg, Intravenous, Daily, Aram Payne DO, Last Rate: 200 mL/hr at 01/16/24 0917, 100 mg at 01/16/24 0917  No current outpatient medications on file.     Allergies:  No Known Allergies    Social Hx:  Social History     Socioeconomic History    Marital status:    Tobacco Use    Smoking status: Every Day     Packs/day: 1     Types: Cigarettes   Substance and Sexual Activity    Alcohol use: Yes     Comment: Whiskey -- everyday    Drug use: Not Currently     Types: Methamphetamines     Comment: 1 WEEK AGO       Family Hx:  History reviewed. No pertinent family history.  Physical Examination:   Vital Signs:  Vitals:    01/16/24 0401 01/16/24 0501 01/16/24 0700 01/16/24 0900   BP: 174/100 171/87 161/91 134/86   Pulse: 69 58 68 66   Resp:       Temp:       TempSrc:       SpO2: 95% 97% 96% 95%   Weight:       Height:           General Exam:  Head:  Normocephalic, atraumatic  HEENT:  Neck supple. Poor dentition.   Fundoscopic Exam:  No signs of disc edema  CVS:  Regular rate and rhythm.  No murmurs  Carotid Examination:  No bruits  Lungs:  Clear to auscultation  Abdomen:  Nontender,  Nondistended  Extremities:  No signs of peripheral edema  Skin:  No rashes    Neurologic Exam:    Mental Status:    -Awake, Alert, Oriented X 3  -No word finding difficulties  -No aphasia  -mild dysarthria but patient and SO states this is normal to him  -Follows simple and complex commands    CN II:  Visual fields full.  Pupils equally reactive to light  CN III, IV, VI:  Extraocular Muscles full with no signs of nystagmus  CN V:  Facial sensory is symmetric with no asymmetries.  CN VII:  Facial motor symmetric  CN VIII:  Gross hearing intact bilaterally  CN IX:  Palate elevates symmetrically  CN X:  Palate elevates symmetrically  CN XI:  Shoulder shrug symmetric  CN XII:  Tongue is midline on protrusion    Motor: (strength out of 5:  1= minimal movement, 2 = movement in plane of gravity, 3 = movement against gravity, 4 = movement against some resistance, 5 = full strength)    -Right Upper Ext: Proximal: 4+ Distal: 4+  -Left Upper Ext: Proximal: 5 Distal: 5    -Right Lower Ext: Proximal: 4- Distal: 4_  -Left Lower Ext: Proximal: 5 Distal: 5    DTR:  -Right   Biceps: 2+ Triceps: 2+ Brachioradialis: 2+   Patella: 2+ Ankle: 2+ Neg Babinski  -Left   Biceps: 2+ Triceps: 2+ Brachioradialis: 2+   Patella: 2+ Ankle: 2+ Neg Babinski    Sensory:  -Intact to light touch, pinprick, temperature, pain, and proprioception    Coordination:  -Finger to nose  with ataxia on right, left intact  -Heel to shin with ataxia on right, intact on left      Gait  -not tested for safety reasons.       Recent Diagnostics:   Laboratory Results:   - Reviewed in EMR  Lab Results   Component Value Date    GLUCOSE 84 01/15/2024    CALCIUM 9.6 01/15/2024     01/15/2024    K 3.5 01/15/2024    CO2 30.0 (H) 01/15/2024     01/15/2024    BUN 9 01/15/2024    CREATININE 0.72 (L) 01/15/2024    BCR 12.5 01/15/2024    ANIONGAP 9.0 01/15/2024     Lab Results   Component Value Date    WBC 6.65 01/15/2024    HGB 16.7 01/15/2024    HCT 50.7 01/15/2024     MCV 91.2 01/15/2024     01/15/2024     Lab Results   Component Value Date    PTT 29.2 01/15/2024    INR 0.92 01/15/2024     Lab Results   Component Value Date    TRIG 143 01/15/2024    HDL 40 01/15/2024    LDL 95 01/15/2024     Lab Results   Component Value Date    HGBA1C 5.60 01/16/2024       Imaging Results:  Imaging Results (Last 24 Hours)       Procedure Component Value Units Date/Time    MRI Brain Without Contrast [[id number]] Collected: 01/16/24 1116     Updated: 01/16/24 1129    Narrative:      Exam: MRI BRAIN WO CONTRAST- 1/16/2024 9:40 AM     History: CVA; R53.1-Weakness     Technique: Multisequence, multiplanar MRI of the brain was performed  without intravenous contrast.      Contrast: None.     Comparison: CT head 1/15/2024     Findings:      Acute infarct involving the left corona radiata. Minimal associated  vasogenic edema without significant mass effect or hemorrhagic  conversion.     Small left centrum semiovale old lacunar infarct. Presumed remote right  basal ganglia lacunar infarct. Mild presumed superimposed chronic small  vessel ischemic changes. Small focus of susceptibility artifact  involving the right basal ganglia and left thalamus.     Ventricles and extra-axial CSF spaces are normal in size. Major vascular  flow voids are preserved.     Sella/parasellar structures are grossly unremarkable. No tonsillar  ectopia.     Orbits are grossly unremarkable. Minimal right maxillary sinus mucosal  thickening. Mastoid air cells are grossly clear.     No suspicious calvarial or extracranial soft tissue abnormality.       Impression:      Impression:     Acute left corona radiata small infarct. No significant mass effect or  hemorrhagic conversion.     Additional old left corona radiata and right basal ganglia presumed  lacunar infarcts with mild chronic small vessel ischemic changes.     Punctate focus of susceptibility artifact in the right basal ganglia and  left thalamus, likely from  remote microhemorrhages, possibly  hypertensive related.        This report was signed and finalized on 1/16/2024 11:26 AM by Braden Perez.       CT Cervical Spine Without Contrast [[id number]] Collected: 01/15/24 2029     Updated: 01/15/24 2033    Narrative:      EXAMINATION: CT CERVICAL SPINE WO CONTRAST-      1/15/2024 7:13 PM     HISTORY: Fall injury. Neck pain.     In order to have a CT radiation dose as low as reasonably achievable  Automated Exposure Control was utilized for adjustment of the mA and/or  KV according to patient size.     CT Dose DLP = 1160.14 mGy.cm.  (If there are multiple studies performed at the same time this  represents the total dose).     Axial, sagittal, and coronal noncontrast CT imaging.     Vertebral bodies and posterior elements are intact.     Reconstructed sagittal images shows straightening of the normal lordotic  curvature.   There is no malalignment. Prevertebral soft tissues are normal.   Facet joints align normally.     Reconstructed coronal images show normal lateral mass alignment.       Impression:      1. No acute bony abnormality.           This report was signed and finalized on 1/15/2024 8:30 PM by Dr. Heber Saravia MD.       XR Hip With or Without Pelvis 2 - 3 View Right [[id number]] Collected: 01/15/24 2028     Updated: 01/15/24 2032    Narrative:      EXAMINATION: XR HIP W OR WO PELVIS 2-3 VIEW RIGHT-     1/15/2024 7:24 PM     HISTORY: Fall injury. RIGHT hip pain     COMPARISON:  None.     Pelvis and RIGHT hip, 3 views.     The pelvic ring is intact.  No pubic symphysis or sacroiliac joint diastasis.     The proximal RIGHT femur and acetabulum are intact.     Normal appearance of the hip joint space.     Soft tissues are appropriate.     Summary:     1. No acute bony abnormality is seen.           This report was signed and finalized on 1/15/2024 8:29 PM by Dr. Heber Saravia MD.       CT Head Without Contrast [[id number]] Collected: 01/15/24 2027     Updated:  01/15/24 2031    Narrative:      EXAMINATION: CT HEAD WO CONTRAST-      1/15/2024 7:13 PM     HISTORY: Fall injury. RIGHT side weakness.     In order to have a CT radiation dose as low as reasonably achievable  Automated Exposure Control was utilized for adjustment of the mA and/or  KV according to patient size.     CT Dose DLP = 1160.14 mGy.cm.  (If there are multiple studies performed at the same time this  represents the total dose).     Axial, sagittal, and coronal noncontrast CT imaging of the head.     The visualized paranasal sinuses are clear.     The brain and ventricles have an age appropriate appearance.   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  There is no hemorrhage or mass-effect.   No acute infarction is seen.     No calvarial abnormality.       Impression:      1. No acute intracranial abnormality is seen.           This report was signed and finalized on 1/15/2024 8:28 PM by Dr. Heber Saravia MD.              MRI brain personally reviewed by me showing acute left coronoa radiata, likely lacunar.     Other labs:  LDL 95  A1C 5.6  TSH 0.819  UDS + methamphetamine and amphetamines      Assessment & Plan:   Patient 53 y.o. male right handed with no significant medical history and does not follow with PCP. He does admit to 1ppd tobacco use for 20 years, 20 years of EOTH use of mixed drinks, and methamphetamine use but had gone to rehab and uses meth periodically. He denies IV drug use. History is obtained by patient and his significant other who is at the bedside.    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 He had difficulty getting up. He did notice right arm mildly weak as well. He and SO denies facial weakness or altered speech. Patient  presented to [facility] ED on 1/15/2024 as he was not improving. CT head showed no acute process. CT CS no acute bony abnormality. Patient admitted for further evaluation. MRI brain done this AM shows acute stroke left corona radiata consistent with patient presentation. TTE done shows no PFO or ASD and no structural abnormality other than trace aortic valve regurgitation. He has noted some improvement of right arm and leg since admission. IV TNK not considered as patient presented 3 days after onset of symptoms. Patient hypertensive upon admission at 177/100 and as elevated as 184/102. Patient secondary stroke risk factors include HTN, HLD, tobacco use, illicit drug use.       Impression:  Acute left corona radiata stroke, likely lacunar.  Hypertension.  Tobacco use  ETOH use  Illicit drug use.  HLD, LDL 95.       Plan:    mg daily.  Lipitor 80 mg daily for LDL goal less than 70.  SBP goal less than 140 as he is now 3-4 days from symptom onset.  OT/PT/ST  CTA head and neck.  Tobacco and illicit drug use cessation counseling. ETOH cessation counseling.   SCD for DVT ppx.      Tari Lama, APRN  01/16/24  13:13 CST    Medical Decision Making    Number/Complexity of Problems  Moderate  1 undiagnosed new problem with uncertain prognosis -   1 acute illness with systemic symptoms -   High  1 acute or chronic illness that poses a threat to life/body function -   high     MDM Data  Moderate - 1/3 categories  Extensive - 2/3 categories    Category 1: 3 of the following  Review of external notes  Review of results  Ordering of each unique test  Independent historian  Category 2:  Independent interpretation of test (ex: imaging)  Category 3:  Discussion of management with another provider    extensive     Treatment Plan  Moderate - Prescription Drug management  High  Drug therapy requiring intensive monitoring for toxicity  Decision regarding hospitalization or escalation of care  De-escalate care/DNR  decisions  high

## 2024-01-16 NOTE — THERAPY DISCHARGE NOTE
Acute Care - Speech Language Pathology Initial Evaluation/Discharge  [facility]     Patient Name: Jermaine Howard  : 1970  MRN: [id number]  Today's Date: 2024               Admit Date: 1/15/2024   ST speech/language/cognitive evaluation completed. Pt admitted for acute stroke of right basal ganglia. Swallow screen passed and pt started on PO diet. Pt able to answer questions appropriately and in a timely manner. Alert and oriented x4. No trouble following commands or problem solving noted. Pt reports feeling baseline at this time in regards to communication/cognition. ST services no longer warranted. MD to reconsult if change or new concern.   Kiana White MS-CCC/SLP, CNT 2024 15:01 CST    Visit Dx:    ICD-10-CM ICD-9-CM   1. Acute right-sided weakness  R53.1 728.87   2. Cognitive and behavioral changes  R41.89 799.59    R46.89 312.9     Patient Active Problem List   Diagnosis    Acute stroke of right basal ganglia    Tobacco use    Alcohol use    Methamphetamine use    Hyperlipidemia, LDL 95     History reviewed. No pertinent past medical history.  History reviewed. No pertinent surgical history.    SLP Recommendation and Plan  SLP Diagnosis: functional speech/language skills, functional cognitive-linguistic skills (24)        [facility] Criteria for Skilled Therapy Interventions Met: baseline status (24)  Anticipated Discharge Disposition (SLP): home (24 145)     Therapy Frequency (SLP [facility]): evaluation only (24)                    Reason for Discharge: other (see comments) (eval only) (24 145)                Plan of Care Reviewed With: patient (24 145)  Progress: no change (eval) (24 145)  Outcome Evaluation: ST speech/language/cognitive evaluation completed. Pt admitted for acute stroke of right basal ganglia. Swallow screen passed and pt started on PO diet. Pt able to answer questions appropriately and in a timely manner. Alert and oriented  x4. No trouble following commands or problem solving noted. Pt reports feeling baseline at this time in regards to communication/cognition. ST services no longer warranted. MD to reconsult if change or new concern. (01/16/24 6142)    SLP EVALUATION (last 72 hours)       SLP SLC Evaluation       Row Name 01/16/24 6941                   Communication Assessment/Intervention    Document Type evaluation  -BN        Subjective Information no complaints  -BN        Patient Observations alert;cooperative  -BN        Patient/Family/Caregiver Comments/Observations no family present  -BN        Patient Effort excellent  -BN           General Information    Patient Profile Reviewed yes  -BN        Pertinent History Of Current Problem acute stroke of right basal ganglia  -BN        Precautions/Limitations, Vision WFL  -BN        Precautions/Limitations, Hearing WFL  -BN        Prior Level of Function-Communication WFL  -BN        Plans/Goals Discussed with patient  -BN        Barriers to Rehab none identified  -BN        Patient's Goals for Discharge return to home  -BN           Pain    Additional Documentation Pain Scale: FACES Pre/Post-Treatment (Group)  -BN           Pain Scale: FACES Pre/Post-Treatment    Pain: FACES Scale, Pretreatment 0-->no hurt  -BN        Posttreatment Pain Rating 0-->no hurt  -BN           Comprehension Assessment/Intervention    Comprehension Assessment/Intervention Auditory Comprehension  -BN           Auditory Comprehension Assessment/Intervention    Auditory Comprehension (Communication) WFL  -BN        Able to Identify Objects/Pictures (Communication) body part;familiar objects;WFL  -BN        Answers Questions (Communication) yes/no;personal;simple;WFL  -BN        Able to Follow Commands (Communication) 1-step;2-step;WFL  -BN           Expression Assessment/Intervention    Expression Assessment/Intervention verbal expression  -BN           Verbal Expression Assessment/Intervention    Verbal  Expression WFL  -BN        Automatic Speech (Communication) response to greeting;counting 1-20;days of week;months of year;WFL  -BN        Phrase Completion automatic/predictable;WFL  -BN        Confrontational Naming WFL  -BN        Sentence Formulation simple;complex;WFL  -BN        Conversational Discourse/Fluency WFL  -BN           Cognitive Assessment Intervention- SLP    Cognitive Function (Cognition) L  -BN        Orientation Status (Cognition) awareness of basic personal information;person;place;time;situation;WFL  -BN        Attention (Cognitive) WFL  -BN        Thought Organization (Cognitive) concrete divergent;concrete convergent;WFL  -BN        Reasoning (Cognitive) absurdities;WFL  -BN        Problem Solving (Cognitive) WFL  -BN        Pragmatics (Communication) WFL  -BN           SLP Evaluation Clinical Impressions    SLP Diagnosis functional speech/language skills;functional cognitive-linguistic skills  -BN        [facility] Criteria for Skilled Therapy Interventions Met baseline status  -BN           SLP Treatment Clinical Impressions    Care Plan Review evaluation/treatment results reviewed  -BN           Recommendations    Therapy Frequency (SLP SLC) evaluation only  -BN        Anticipated Discharge Disposition (SLP) home  -                  User Key  (r) = Recorded By, (t) = Taken By, (c) = Cosigned By      Initials Name Effective Dates    Kiana Rogers, MS-CCC/SLP, CNT 07/11/23 -                        EDUCATION  The patient has been educated in the following areas:   Cognitive Impairment Communication Impairment.                  Time Calculation:    Time Calculation- SLP       Row Name 01/16/24 1457             Time Calculation- SLP    SLP Start Time 1428  -BN      SLP Stop Time 1457  -      SLP Time Calculation (min) 29 min  -      SLP Received On 01/16/24  -         Untimed Charges    SLP Eval/Re-eval  ST Eval Speech and Production w/ Language - 33338  -BN      45708-CV Carolal  Oral Pharyng Swallow Minutes 29  -BN         Total Minutes    Untimed Charges Total Minutes 29  -BN       Total Minutes 29  -BN                User Key  (r) = Recorded By, (t) = Taken By, (c) = Cosigned By      Initials Name Provider Type    Kiana Rogers, MS-CCC/SLP, JOSHUA Speech and Language Pathologist                    Therapy Charges for Today       Code Description Service Date Service Provider Modifiers Qty    [id number] HC ST EVAL SPEECH AND PROD W LANG  2 1/16/2024 Kiana White MS-CCC/SLP, JOSHUA GN 1                     SLP Discharge Summary  Anticipated Discharge Disposition (SLP): home  Reason for Discharge: other (see comments) (eval only)  Progress Toward Achieving Short/long Term Goals: other (see comments) (eval only)  Discharge Destination: other (see comments) (still in acute care)    MAKENNA Aguirre/SLP, JOSHUA  1/16/2024

## 2024-01-16 NOTE — ED PROVIDER NOTES
Subjective   History of Present Illness  Pt presents to the  with report of fall at home - presented with report of R arm/hip pain.  Pt states he fell 2d ago - felt like his leg gave out.  He states that he had some weakness in R arm and leg at that time but it resolved after a short time.  He then fell again in the shower - states felt like his leg wasn't working right/couldn't hold him - states that his arm was also weak and had some numbness.  Denies any neck pain.  Has some lower back pain which is his baseline - has not worsened.  Denies any speech/vision changes.  No CP/SOB. Denies any incontinence.  No cough/congestion.  Pt admits to drinking etoh daily and h/o methamphetamine use.          Review of Systems   Constitutional:  Negative for chills and fever.   HENT:  Negative for trouble swallowing and voice change.    Eyes:  Negative for photophobia and visual disturbance.   Respiratory:  Negative for chest tightness and shortness of breath.    Cardiovascular:  Negative for chest pain, palpitations and leg swelling.   Gastrointestinal:  Negative for abdominal pain, diarrhea and vomiting.   Genitourinary:  Negative for dysuria.   Musculoskeletal:  Positive for back pain. Negative for neck pain.        + R hip pain, + R shoulder discomfort   Neurological:  Positive for weakness. Negative for syncope, light-headedness and headaches.   Psychiatric/Behavioral:  Negative for confusion.    All other systems reviewed and are negative.      History reviewed. No pertinent past medical history.    No Known Allergies    History reviewed. No pertinent surgical history.    History reviewed. No pertinent family history.    Social History     Tobacco Use    Smoking status: Every Day     Packs/day: 1     Types: Cigarettes   Substance and Sexual Activity    Alcohol use: Yes     Comment: Sha -- everyday    Drug use: Not Currently     Types: Methamphetamines     Comment: 1 WEEK AGO           Objective   Physical  Exam  Vitals and nursing note reviewed.   Constitutional:       General: He is not in acute distress.     Appearance: Normal appearance.   HENT:      Head: Normocephalic and atraumatic.      Nose: Nose normal.      Mouth/Throat:      Mouth: Mucous membranes are moist.   Eyes:      Extraocular Movements: Extraocular movements intact.      Conjunctiva/sclera: Conjunctivae normal.      Pupils: Pupils are equal, round, and reactive to light.   Cardiovascular:      Rate and Rhythm: Normal rate and regular rhythm.      Pulses: Normal pulses.      Heart sounds: Normal heart sounds.   Pulmonary:      Effort: Pulmonary effort is normal.      Breath sounds: Normal breath sounds.   Abdominal:      General: Abdomen is flat. Bowel sounds are normal.      Palpations: Abdomen is soft.   Musculoskeletal:         General: No swelling or signs of injury.      Cervical back: Normal range of motion and neck supple. No rigidity or tenderness.   Skin:     General: Skin is warm and dry.      Capillary Refill: Capillary refill takes less than 2 seconds.   Neurological:      Mental Status: He is alert and oriented to person, place, and time.      Cranial Nerves: No cranial nerve deficit.      Comments: + weakness to RUE RLE compared to L side.  No pronator drift.  Mild tremor noted. DTRs 1/4 bilat patellar   Psychiatric:         Mood and Affect: Mood normal.         Procedures           ED Course      Labs Reviewed   COMPREHENSIVE METABOLIC PANEL - Abnormal; Notable for the following components:       Result Value    Creatinine 0.72 (*)     CO2 30.0 (*)     All other components within normal limits    Narrative:     GFR Normal >60  Chronic Kidney Disease <60  Kidney Failure <15     PROTIME-INR - Normal   APTT - Normal   LIPASE - Normal   SINGLE HSTROPONIN T - Normal    Narrative:     High Sensitive Troponin T Reference Range:  <14.0 ng/L- Negative Female for AMI  <22.0 ng/L- Negative Male for AMI  >=14 - Abnormal Female indicating possible  myocardial injury.  >=22 - Abnormal Male indicating possible myocardial injury.   Clinicians would have to utilize clinical acumen, EKG, Troponin, and serial changes to determine if it is an Acute Myocardial Infarction or myocardial injury due to an underlying chronic condition.        TSH - Normal   T4, FREE - Normal    Narrative:     Results may be falsely increased if patient taking Biotin.     MAGNESIUM - Normal   CBC WITH AUTO DIFFERENTIAL - Normal   ETHANOL    Narrative:     Not for legal purposes. Chain of Custody not followed.    LIPID PANEL    Narrative:     Cholesterol Reference Ranges  (U.S. Department of Health and Human Services ATP III Classifications)    Desirable          <200 mg/dL  Borderline High    200-239 mg/dL  High Risk          >240 mg/dL      Triglyceride Reference Ranges  (U.S. Department of Health and Human Services ATP III Classifications)    Normal           <150 mg/dL  Borderline High  150-199 mg/dL  High             200-499 mg/dL  Very High        >500 mg/dL    HDL Reference Ranges  (U.S. Department of Health and Human Services ATP III Classifications)    Low     <40 mg/dl (major risk factor for CHD)  High    >60 mg/dl ('negative' risk factor for CHD)        LDL Reference Ranges  (U.S. Department of Health and Human Services ATP III Classifications)    Optimal          <100 mg/dL  Near Optimal     100-129 mg/dL  Borderline High  130-159 mg/dL  High             160-189 mg/dL  Very High        >189 mg/dL   URINALYSIS W/ MICROSCOPIC IF INDICATED (NO CULTURE)   URINE DRUG SCREEN   VITAMIN B12   CBC AND DIFFERENTIAL    Narrative:     The following orders were created for panel order CBC & Differential.  Procedure                               Abnormality         Status                     ---------                               -----------         ------                     CBC Auto Differential[[id number]]        Normal              Final result                 Please view results for these  tests on the individual orders.     XR Hip With or Without Pelvis 2 - 3 View Right   Final Result      CT Cervical Spine Without Contrast   Final Result   1. No acute bony abnormality.               This report was signed and finalized on 1/15/2024 8:30 PM by Dr. Heber Saravia MD.          CT Head Without Contrast   Final Result   1. No acute intracranial abnormality is seen.               This report was signed and finalized on 1/15/2024 8:28 PM by Dr. Heber Saravia MD.                                                     Medical Decision Making  Pt stable in EC - NAD att.  No evid of ICH/mass.  No evid of SBI/sepsis.  Pt with hx s/o possible stroke affecting R UE/LE.  Given distribution, this does not appear c/w Wernicke's/Beri Beri.  Given dose of thiamine/folate.  NIHSS 0.  Symptoms present > 24hrs - not candidate for tpa.  Have d/w Dr. Painting for admit/further evaluation for stroke.     Amount and/or Complexity of Data Reviewed  Labs: ordered.  Radiology: ordered.        Final diagnoses:   Acute right-sided weakness       ED Disposition  ED Disposition       ED Disposition   Decision to Admit    Condition   --    Comment   --               No follow-up provider specified.       Medication List      No changes were made to your prescriptions during this visit.            Aram Payne DO  01/15/24 2015       Aram Payne DO  01/15/24 2014

## 2024-01-16 NOTE — CASE MANAGEMENT/SOCIAL WORK
Discharge Planning Assessment  [facility]     Patient Name: Jermaine Howard  MRN: [id number]  Today's Date: 1/16/2024    Admit Date: 1/15/2024        Discharge Needs Assessment       Row Name 01/16/24 1404       Living Environment    People in Home significant other    Current Living Arrangements home    Potentially Unsafe Housing Conditions none    Primary Care Provided by self    Provides Primary Care For no one    Family Caregiver if Needed significant other    Quality of Family Relationships involved;helpful    Able to Return to Prior Arrangements yes       Resource/Environmental Concerns    Resource/Environmental Concerns none    Transportation Concerns none       Transition Planning    Patient/Family Anticipates Transition to home    Patient/Family Anticipated Services at Transition none    Transportation Anticipated family or friend will provide       Discharge Needs Assessment    Readmission Within the Last 30 Days no previous admission in last 30 days    Equipment Currently Used at Home none    Concerns to be Addressed denies needs/concerns at this time    Anticipated Changes Related to Illness none    Equipment Needed After Discharge none    Discharge Coordination/Progress PT resides at home with significant other. PT was independent prior to admission. PT has no PCP or Rx coverage. PT has Medicaid application pending. PT plans to return home at [place] and denies any needs at this time. SW will follow and assist as needed.                   Discharge Plan    No documentation.                 Continued Care and Services - Admitted Since 1/15/2024    Coordination has not been started for this encounter.          Demographic Summary    No documentation.                  Functional Status    No documentation.                  Psychosocial    No documentation.                  Abuse/Neglect    No documentation.                  Legal    No documentation.                  Substance Abuse    No documentation.                   Patient Forms    No documentation.                     AMBER VidalW

## 2024-01-17 ENCOUNTER — APPOINTMENT (OUTPATIENT)
Dept: CARDIOLOGY | Facility: HOSPITAL | Age: 54
End: 2024-01-17
Payer: MEDICAID

## 2024-01-17 ENCOUNTER — READMISSION MANAGEMENT (OUTPATIENT)
Dept: CALL CENTER | Facility: HOSPITAL | Age: 54
End: 2024-01-17
Payer: MEDICAID

## 2024-01-17 VITALS
SYSTOLIC BLOOD PRESSURE: 138 MMHG | BODY MASS INDEX: 28.17 KG/M2 | OXYGEN SATURATION: 97 % | HEART RATE: 73 BPM | WEIGHT: 165 LBS | DIASTOLIC BLOOD PRESSURE: 72 MMHG | RESPIRATION RATE: 18 BRPM | TEMPERATURE: 97.5 F | HEIGHT: 64 IN

## 2024-01-17 PROBLEM — I10 PRIMARY HYPERTENSION: Status: ACTIVE | Noted: 2024-01-17

## 2024-01-17 LAB
ALBUMIN SERPL-MCNC: 3.8 G/DL (ref 3.5–5.2)
ALBUMIN/GLOB SERPL: 1.5 G/DL
ALP SERPL-CCNC: 72 U/L (ref 39–117)
ALT SERPL W P-5'-P-CCNC: 11 U/L (ref 1–41)
ANION GAP SERPL CALCULATED.3IONS-SCNC: 10 MMOL/L (ref 5–15)
AST SERPL-CCNC: 18 U/L (ref 1–40)
BILIRUB SERPL-MCNC: 0.4 MG/DL (ref 0–1.2)
BUN SERPL-MCNC: 9 MG/DL (ref 6–20)
BUN/CREAT SERPL: 11.1 (ref 7–25)
CALCIUM SPEC-SCNC: 8.6 MG/DL (ref 8.6–10.5)
CHLORIDE SERPL-SCNC: 107 MMOL/L (ref 98–107)
CO2 SERPL-SCNC: 24 MMOL/L (ref 22–29)
CREAT SERPL-MCNC: 0.81 MG/DL (ref 0.76–1.27)
DEPRECATED RDW RBC AUTO: 41.1 FL (ref 37–54)
EGFRCR SERPLBLD CKD-EPI 2021: 105.4 ML/MIN/1.73
ERYTHROCYTE [DISTWIDTH] IN BLOOD BY AUTOMATED COUNT: 12.4 % (ref 12.3–15.4)
GLOBULIN UR ELPH-MCNC: 2.5 GM/DL
GLUCOSE BLDC GLUCOMTR-MCNC: 105 MG/DL (ref 70–130)
GLUCOSE SERPL-MCNC: 101 MG/DL (ref 65–99)
HCT VFR BLD AUTO: 45.6 % (ref 37.5–51)
HGB BLD-MCNC: 15.5 G/DL (ref 13–17.7)
MCH RBC QN AUTO: 30.3 PG (ref 26.6–33)
MCHC RBC AUTO-ENTMCNC: 34 G/DL (ref 31.5–35.7)
MCV RBC AUTO: 89.2 FL (ref 79–97)
PLATELET # BLD AUTO: 214 10*3/MM3 (ref 140–450)
PMV BLD AUTO: 11 FL (ref 6–12)
POTASSIUM SERPL-SCNC: 3.6 MMOL/L (ref 3.5–5.2)
PROT SERPL-MCNC: 6.3 G/DL (ref 6–8.5)
RBC # BLD AUTO: 5.11 10*6/MM3 (ref 4.14–5.8)
SODIUM SERPL-SCNC: 141 MMOL/L (ref 136–145)
WBC NRBC COR # BLD AUTO: 6.47 10*3/MM3 (ref 3.4–10.8)

## 2024-01-17 PROCEDURE — 82948 REAGENT STRIP/BLOOD GLUCOSE: CPT

## 2024-01-17 PROCEDURE — 97161 PT EVAL LOW COMPLEX 20 MIN: CPT | Performed by: PHYSICAL THERAPIST

## 2024-01-17 PROCEDURE — 99233 SBSQ HOSP IP/OBS HIGH 50: CPT | Performed by: CLINICAL NURSE SPECIALIST

## 2024-01-17 PROCEDURE — 93246 EXT ECG>7D<15D RECORDING: CPT

## 2024-01-17 PROCEDURE — 80053 COMPREHEN METABOLIC PANEL: CPT | Performed by: NURSE PRACTITIONER

## 2024-01-17 PROCEDURE — 97165 OT EVAL LOW COMPLEX 30 MIN: CPT

## 2024-01-17 PROCEDURE — 25010000002 THIAMINE PER 100 MG: Performed by: EMERGENCY MEDICINE

## 2024-01-17 PROCEDURE — 36415 COLL VENOUS BLD VENIPUNCTURE: CPT | Performed by: NURSE PRACTITIONER

## 2024-01-17 PROCEDURE — 85027 COMPLETE CBC AUTOMATED: CPT | Performed by: NURSE PRACTITIONER

## 2024-01-17 RX ORDER — ASPIRIN 81 MG/1
81 TABLET, CHEWABLE ORAL DAILY
Qty: 30 TABLET | Refills: 1 | Status: SHIPPED | OUTPATIENT
Start: 2024-01-18 | End: 2024-01-22 | Stop reason: SDUPTHER

## 2024-01-17 RX ORDER — ATORVASTATIN CALCIUM 80 MG/1
80 TABLET, FILM COATED ORAL NIGHTLY
Qty: 30 TABLET | Refills: 1 | Status: SHIPPED | OUTPATIENT
Start: 2024-01-17 | End: 2024-01-22 | Stop reason: SDUPTHER

## 2024-01-17 RX ORDER — ASPIRIN 81 MG/1
81 TABLET, CHEWABLE ORAL DAILY
Status: DISCONTINUED | OUTPATIENT
Start: 2024-01-18 | End: 2024-01-17 | Stop reason: HOSPADM

## 2024-01-17 RX ORDER — LOSARTAN POTASSIUM 25 MG/1
25 TABLET ORAL
Qty: 30 TABLET | Refills: 1 | Status: SHIPPED | OUTPATIENT
Start: 2024-01-18 | End: 2024-01-22 | Stop reason: SDUPTHER

## 2024-01-17 RX ADMIN — THIAMINE HYDROCHLORIDE 100 MG: 100 INJECTION, SOLUTION INTRAMUSCULAR; INTRAVENOUS at 09:14

## 2024-01-17 RX ADMIN — ASPIRIN 325 MG: 325 TABLET, FILM COATED ORAL at 09:14

## 2024-01-17 RX ADMIN — Medication 10 ML: at 09:14

## 2024-01-17 RX ADMIN — FOLIC ACID 1 MG: 5 INJECTION, SOLUTION INTRAMUSCULAR; INTRAVENOUS; SUBCUTANEOUS at 09:14

## 2024-01-17 RX ADMIN — LOSARTAN POTASSIUM 25 MG: 50 TABLET, FILM COATED ORAL at 09:14

## 2024-01-17 NOTE — PLAN OF CARE
Goal Outcome Evaluation:  Plan of Care Reviewed With: patient        Progress: improving  Outcome Evaluation: OT bj complete. Pt A&O x4. Pt c/o RUE and RLE weakness prior to treatment. PLOF independent in all household mobility, ADLs, driving, & shopping. Pt lives at home with significant other and plans to return home at d/c. BUE ROM WFL. RUE exhibits weakness and lags behind during ROM testing. Static/dynamic sitting Independent EOB. Set up to kory shoes sitting EOB. Sit to stand SBA. Static/dynamic standing bal required CGA for R knee hyperextension. CGA and verbal cues for stand to sit and functional mobility using front wheeled walker from bed to hallway back to chair. Pt exhibits RUE gross motor defecit and dysdiadochokinesia. Pt safe to d/c home with assistance upon medical clearence. Pt provided with strengthening and FM/GM coordination UE HEPs. DME recommendations discussed with pt and so for fall prevention and home safety.      Anticipated Discharge Disposition (OT): (P) home with assist

## 2024-01-17 NOTE — PLAN OF CARE
Goal Outcome Evaluation:  Plan of Care Reviewed With: patient        Progress: improving  Outcome Evaluation: Patient A/O x 4. VSS On room air. No PRNs requested. Eating and voiding well. Up ad jeana. Patient states he feels he is at baseline now, with only very slight tingling to RLE. NIH= 1. No new concerns. Safety maintained.

## 2024-01-17 NOTE — PLAN OF CARE
Goal Outcome Evaluation:               Pt aox4. Wife at bedside. Discharge orders in, pt waiting for walker and ziopatch and will dc home via private vehicle.

## 2024-01-17 NOTE — PLAN OF CARE
Goal Outcome Evaluation:  Plan of Care Reviewed With: patient        Progress: improving  Outcome Evaluation: The patient presents alert and oriented x4 lying in bed. He demononstrates mild R UE shoulder weakness and moderate L LE weakness with ataxia of both. He is R UE dominate. He fatigues quickly with ambulation and has a R toe drag due to his R foot drop. He is only able to compensate for about 30ft. He requires use of RW to ambulate safely. He was educated on proper gait mechanics to progress his ambulation safely and coordination. He will benefit from continued PT to work on these however he is discharging home today with no resources for further therapy.      Anticipated Discharge Disposition (PT): home with assist

## 2024-01-17 NOTE — PLAN OF CARE
Goal Outcome Evaluation:         Pt came up from the ER at around 1600. His NIH is 5. He hasn't complained of pain since being up here. He is A&Ox4 and afebrile. He went for a CT angiogram and results are pending.

## 2024-01-17 NOTE — THERAPY DISCHARGE NOTE
Acute Care - Occupational Therapy Initial Evaluation/Discharge  [facility]     Patient Name: Jermaine Howard  : 1970  MRN: [id number]  Today's Date: 2024  Onset of Illness/Injury or Date of Surgery: 01/15/24  Date of Referral to OT: 01/15/24  Referring Physician: Manisha Painting DO      Admit Date: 1/15/2024       ICD-10-CM ICD-9-CM   1. Acute right-sided weakness  R53.1 728.87   2. Cognitive and behavioral changes  R41.89 799.59    R46.89 312.9     Patient Active Problem List   Diagnosis    Acute stroke of right basal ganglia    Tobacco use    Alcohol use    Methamphetamine use    Hyperlipidemia, LDL 95    Primary hypertension     History reviewed. No pertinent past medical history.  History reviewed. No pertinent surgical history.    OT ASSESSMENT FLOWSHEET (last 12 hours)       OT Evaluation and Treatment       Row Name 24 0751                   OT Time and Intention    Subjective Information complains of;weakness  pt c/o RUE and RLE weakness  -AC (r) HW (t) AC (c)        Document Type evaluation  -AC (r) HW (t) AC (c)        Mode of Treatment occupational therapy  -AC (r) HW (t) AC (c)        Patient Effort excellent  -AC (r) HW (t) AC (c)        Symptoms Noted During/After Treatment other (see comments)  -AC (r) HW (t) AC (c)        Comment --  -AC (r) HW (t) AC (c)           General Information    Patient Profile Reviewed yes  -AC (r) HW (t) AC (c)        Onset of Illness/Injury or Date of Surgery 01/15/24  -AC (r) HW (t) AC (c)        Referring Physician Manisha Painting DO  -AC (r) HW (t) AC (c)        Prior Level of Function independent:;all household mobility;ADL's;feeding;dressing;grooming;bathing;cooking;home management;cleaning;driving;shopping  -AC (r) HW (t) AC (c)        Equipment Currently Used at Home grab bar  pt has grab bars but they are not currently installed  -AC (r) HW (t) AC (c)        Pertinent History of Current Functional Problem fall, RUE/RLE pain/weakness,  dyscordination, Dx CVA, hx of chronic lower back pain, etoh use, elicit drug use  -AC (r) HW (t) AC (c)        Existing Precautions/Restrictions fall  -AC (r) HW (t) AC (c)        Equipment Issued to Patient gait belt  -AC (r) HW (t) AC (c)        Risks Reviewed patient:;other (comment)  R knee hyperextension  -AC (r) HW (t) AC (c)        Benefits Reviewed patient:;increase balance;increase strength;increase independence;improve function  -AC (r) HW (t) AC (c)        Barriers to Rehab environmental barriers  -AC (r) HW (t) AC (c)           Living Environment    Current Living Arrangements home  -AC (r) HW (t) AC (c)        Home Accessibility stairs to enter home  -AC (r) HW (t) AC (c)        People in Home significant other  -AC (r) HW (t) AC (c)        Name(s) of People in Home Linda  -AC (r) HW (t) AC (c)        Primary Care Provided by self  -AC (r) HW (t) AC (c)           Home Main Entrance    Number of Stairs, Main Entrance five  -AC (r) HW (t) AC (c)        Stair Railings, Main Entrance railings on both sides of stairs  -AC (r) HW (t) AC (c)           Pain Assessment    Pretreatment Pain Rating 0/10 - no pain  -AC (r) HW (t) AC (c)        Posttreatment Pain Rating 0/10 - no pain  -AC (r) HW (t) AC (c)           Cognition    Orientation Status (Cognition) oriented x 4  -AC (r) HW (t) AC (c)        Personal Safety Interventions fall prevention program maintained;gait belt  -AC (r) HW (t) AC (c)           Range of Motion Comprehensive    General Range of Motion bilateral upper extremity ROM WFL  -AC (r) HW (t) AC (c)        Comment, General Range of Motion RUE moves more slowly through full AROM  -AC (r) HW (t) AC (c)           Strength Comprehensive (MMT)    Comment, General Manual Muscle Testing (MMT) Assessment LUE 4+/5; RUE 4+/5 except R shoulder 4/5  -AC (r) HW (t) AC (c)           Sensory    Additional Documentation Sensory Assessment (Somatosensory) (Group);Vision Assessment/Intervention (Group)  -ISA daley)  "HW (t) AC (c)           Vision Assessment/Intervention    Visual Impairment/Limitations WFL  -AC (r) HW (t) AC (c)           Sensory Assessment (Somatosensory)    Sensory Assessment (Somatosensory) right UE  -AC (r) HW (t) AC (c)        Right UE Sensory Assessment light touch awareness;proprioception;sharp-dull discrimination;intact;stereognosis;impaired  -AC (r) HW (t) AC (c)        Sensory Subjective Reports --  \"less sensitive\"  -AC (r) HW (t) AC (c)           Activities of Daily Living    BADL Assessment/Intervention lower body dressing;feeding  -AC (r) HW (t) AC (c)           Lower Body Dressing Assessment/Training    [place] Level (Lower Body Dressing) don;shoes/slippers;standby assist  -AC (r) HW (t) AC (c)        Position (Lower Body Dressing) edge of bed sitting  -AC (r) HW (t) AC (c)           Self-Feeding Assessment/Training    [place] Level (Feeding) prepare tray/open items;supervision  -AC (r) HW (t) AC (c)        Position (Self-Feeding) supported sitting  -AC (r) HW (t) AC (c)           BADL Safety/Performance    Impairments, BADL Safety/Performance endurance/activity tolerance;motor control;strength;balance;coordination;grasp/prehension;sensory awareness  -AC (r) HW (t) AC (c)           Bed Mobility    Bed Mobility sidelying-sit  -AC (r) HW (t) AC (c)        Sidelying-Sit [place] (Bed Mobility) independent  -AC (r) HW (t) AC (c)           Functional Mobility    Functional Mobility- Ind. Level contact guard assist;verbal cues required  -AC (r) HW (t) AC (c)        Functional Mobility- Device walker, front-wheeled  -AC (r) HW (t) AC (c)        Functional Mobility- Comment bed to hallway to chair; RLE weakness; verbal cues for correct hand placement  -AC (r) HW (t) AC (c)        Patient was able to Ambulate --  -AC (r) HW (t) AC (c)           Transfer Assessment/Treatment    Transfers sit-stand transfer;stand-sit transfer  -AC (r) HW (t) AC (c)           Sit-Stand Transfer    Sit-Stand " Benzie (Transfers) standby assist  -AC (r) HW (t) AC (c)        Assistive Device (Sit-Stand Transfers) walker, front-wheeled  -AC (r) HW (t) AC (c)           Stand-Sit Transfer    Stand-Sit Benzie (Transfers) contact guard;verbal cues  -AC (r) HW (t) AC (c)        Assistive Device (Stand-Sit Transfers) walker, front-wheeled  -AC (r) HW (t) AC (c)           Safety Issues, Functional Mobility    Impairments Affecting Function (Mobility) coordination;balance;endurance/activity tolerance;grasp;motor control;sensation/sensory awareness;strength  -AC (r) HW (t) AC (c)           Motor Skills    Motor Skills coordination  -AC (r) HW (t) AC (c)        Coordination gross motor deficit;right;upper extremity;ataxia;dysdiadochokinesia;finger to nose;minimal impairment  -AC (r) HW (t) AC (c)           Balance    Balance Assessment sitting static balance;sitting dynamic balance;standing static balance;standing dynamic balance  -AC (r) HW (t) AC (c)        Static Sitting Balance independent  -AC (r) HW (t) AC (c)        Dynamic Sitting Balance independent  -AC (r) HW (t) AC (c)        Position, Sitting Balance unsupported;sitting edge of bed  -AC (r) HW (t) AC (c)        Static Standing Balance contact guard  -AC (r) HW (t) AC (c)        Dynamic Standing Balance contact guard  -AC (r) HW (t) AC (c)        Position/Device Used, Standing Balance supported;unsupported;walker, front-wheeled  -AC (r) HW (t) AC (c)           Plan of Care Review    Plan of Care Reviewed With patient  -AC (r) HW (t) AC (c)        Progress improving  -AC (r) HW (t) AC (c)        Outcome Evaluation OT eval complete. Pt A&O x4. Pt c/o RUE and RLE weakness prior to treatment. PLOF independent in all household mobility, ADLs, driving, & shopping. Pt lives at home with significant other and plans to return home at d/c. BUE ROM WFL. RUE exhibits weakness and lags behind during ROM testing. Static/dynamic sitting Independent EOB. Set up to kory shoes  sitting EOB. Sit to stand SBA. Static/dynamic standing bal required CGA for R knee hyperextension. CGA and verbal cues for stand to sit and functional mobility using front wheeled walker from bed to hallway back to chair. Pt exhibits RUE gross motor defecit and dysdiadochokinesia. Pt safe to d/c home with assistance upon medical clearence. Pt provided with strengthening and FM/GM coordination UE HEPs. DME recommendations discussed with pt and so for fall prevention and home safety.  -AC (r) HW (t) AC (c)           Positioning and Restraints    Pre-Treatment Position in bed  -AC (r) HW (t) AC (c)        Post Treatment Position chair  -AC (r) HW (t) AC (c)        In Chair sitting;call light within reach;encouraged to call for assist  -AC (r) HW (t) AC (c)           Therapy Assessment/Plan (OT)    Date of Referral to OT 01/15/24  -AC (r) HW (t) AC (c)        OT Diagnosis decreased independence in ADLs  -AC (r) HW (t) AC (c)        Therapy Frequency (OT) evaluation only  -AC (r) HW (t) AC (c)                  User Key  (r) = Recorded By, (t) = Taken By, (c) = Cosigned By      Initials Name Effective Dates    Christopher Connell, OTR/L, CNT 02/03/23 -     Maine Isabel, OT Student 01/09/24 -                     Occupational Therapy Education       Title: PT OT SLP Therapies (In Progress)       Topic: Occupational Therapy (Done)       Point: ADL training (Done)       Description:   Instruct learner(s) on proper safety adaptation and remediation techniques during self care or transfers.   Instruct in proper use of assistive devices.                  Learning Progress Summary             Patient Acceptance, E,H, VU by  at 1/17/2024 1032    Comment: DME recommendations, home safety, transfer training, FM/GM and strengthening HEP, ADL retraining, neuro recovery   Significant Other Acceptance, E,H, VU by  at 1/17/2024 1032    Comment: DME recommendations, home safety, transfer training, FM/GM and strengthening HEP, ADL  retraining, neuro recovery                         Point: Home exercise program (Done)       Description:   Instruct learner(s) on appropriate technique for monitoring, assisting and/or progressing therapeutic exercises/activities.                  Learning Progress Summary             Patient Acceptance, E,H, VU by  at 1/17/2024 1032    Comment: DME recommendations, home safety, transfer training, FM/GM and strengthening HEP, ADL retraining, neuro recovery   Significant Other Acceptance, E,H, VU by  at 1/17/2024 1032    Comment: DME recommendations, home safety, transfer training, FM/GM and strengthening HEP, ADL retraining, neuro recovery                         Point: Precautions (Done)       Description:   Instruct learner(s) on prescribed precautions during self-care and functional transfers.                  Learning Progress Summary             Patient Acceptance, E,H, VU by  at 1/17/2024 1032    Comment: DME recommendations, home safety, transfer training, FM/GM and strengthening HEP, ADL retraining, neuro recovery   Significant Other Acceptance, E,H, VU by  at 1/17/2024 1032    Comment: DME recommendations, home safety, transfer training, FM/GM and strengthening HEP, ADL retraining, neuro recovery                         Point: Body mechanics (Done)       Description:   Instruct learner(s) on proper positioning and spine alignment during self-care, functional mobility activities and/or exercises.                  Learning Progress Summary             Patient Acceptance, E,H, VU by  at 1/17/2024 1032    Comment: DME recommendations, home safety, transfer training, FM/GM and strengthening HEP, ADL retraining, neuro recovery   Significant Other Acceptance, E,H, VU by  at 1/17/2024 1032    Comment: DME recommendations, home safety, transfer training, FM/GM and strengthening HEP, ADL retraining, neuro recovery                                         User Key       Initials Effective Dates Name  Provider Type Discipline     01/09/24 -  Maine Ko OT Student OT Student OT                    OT Recommendation and Plan  Therapy Frequency (OT): evaluation only  Plan of Care Review  Plan of Care Reviewed With: patient  Progress: improving  Outcome Evaluation: OT eval complete. Pt A&O x4. Pt c/o RUE and RLE weakness prior to treatment. PLOF independent in all household mobility, ADLs, driving, & shopping. Pt lives at home with significant other and plans to return home at d/c. BUE ROM WFL. RUE exhibits weakness and lags behind during ROM testing. Static/dynamic sitting Independent EOB. Set up to kory shoes sitting EOB. Sit to stand SBA. Static/dynamic standing bal required CGA for R knee hyperextension. CGA and verbal cues for stand to sit and functional mobility using front wheeled walker from bed to hallway back to chair. Pt exhibits RUE gross motor defecit and dysdiadochokinesia. Pt safe to d/c home with assistance upon medical clearence. Pt provided with strengthening and FM/GM coordination UE HEPs. DME recommendations discussed with pt and so for fall prevention and home safety.  Plan of Care Reviewed With: patient  Outcome Evaluation: OT eval complete. Pt A&O x4. Pt c/o RUE and RLE weakness prior to treatment. PLOF independent in all household mobility, ADLs, driving, & shopping. Pt lives at home with significant other and plans to return home at d/c. BUE ROM WFL. RUE exhibits weakness and lags behind during ROM testing. Static/dynamic sitting Independent EOB. Set up to kory shoes sitting EOB. Sit to stand SBA. Static/dynamic standing bal required CGA for R knee hyperextension. CGA and verbal cues for stand to sit and functional mobility using front wheeled walker from bed to hallway back to chair. Pt exhibits RUE gross motor defecit and dysdiadochokinesia. Pt safe to d/c home with assistance upon medical clearence. Pt provided with strengthening and FM/GM coordination UE HEPs. DME recommendations  discussed with pt and so for fall prevention and home safety.          Outcome Measures       Row Name 01/17/24 0830             How much help from another is currently needed...    Putting on and taking off regular lower body clothing? 4  -AC (r) HW (t) AC (c)      Bathing (including washing, rinsing, and drying) 2  -AC (r) HW (t) AC (c)      Toileting (which includes using toilet bed pan or urinal) 3  -AC (r) HW (t) AC (c)      Putting on and taking off regular upper body clothing 3  -AC (r) HW (t) AC (c)      Taking care of personal grooming (such as brushing teeth) 3  -AC (r) HW (t) AC (c)      Eating meals 3  -AC (r) HW (t) AC (c)      AM-PAC 6 Clicks Score (OT) 18  -AC (r) HW (t)         Modified [place] Scale    Pre-Stroke Modified Maria E Scale 0 - No Symptoms at all.  -AC (r) HW (t) AC (c)      Modified Maria E Scale 3 - Moderate disability.  Requiring some help, but able to walk without assistance.  -AC (r) HW (t) AC (c)         Functional Assessment    Outcome Measure Options AM-PAC 6 Clicks Daily Activity (OT)  -AC (r) HW (t) AC (c)                User Key  (r) = Recorded By, (t) = Taken By, (c) = Cosigned By      Initials Name Provider Type    Christopher Connell, OTR/L, JOSHUA Occupational Therapist    Maine Isabel, OT Student OT Student                    Time Calculation:    Time Calculation- OT       Row Name 01/17/24 1034             Time Calculation- OT    OT Start Time 0751  +8 minutes education at 10:00-10:08  -AC (r) HW (t) AC (c)      OT Stop Time 0832  -AC (r) HW (t) AC (c)      OT Time Calculation (min) 41 min  -AC (r) HW (t)      OT Received On 01/17/24  -AC (r) HW (t) AC (c)                User Key  (r) = Recorded By, (t) = Taken By, (c) = Cosigned By      Initials Name Provider Type    Christopher Connell, OTR/L, JOSHUA Occupational Therapist    Maine Isabel, OT Student OT Student                             OT Discharge Summary  Anticipated Discharge Disposition (OT): (P) home with  assist  Reason for Discharge: (P) Discharge from facility  Outcomes Achieved: (P) Discharge from facility occurred on same date as evluation  Discharge Destination: (P) Home with assist    Maine Ko OT Student  1/17/2024

## 2024-01-17 NOTE — THERAPY DISCHARGE NOTE
Patient Name: Jermaine Howard  : 1970    MRN: [id number]                              Today's Date: 2024       Admit Date: 1/15/2024    Visit Dx:     ICD-10-CM ICD-9-CM   1. Acute right-sided weakness  R53.1 728.87   2. Cognitive and behavioral changes  R41.89 799.59    R46.89 312.9     Patient Active Problem List   Diagnosis    Acute stroke of right basal ganglia    Tobacco use    Alcohol use    Methamphetamine use    Hyperlipidemia, LDL 95     History reviewed. No pertinent past medical history.  History reviewed. No pertinent surgical history.   General Information       Row Name 24 0750          Physical Therapy Time and Intention    Document Type evaluation  presents with R UE and LE weakness, acute left corona radiate small infarct, old L corona radiate and R BG infarcts  -MS     Mode of Treatment physical therapy;co-treatment  -MS       Row Name 24 075          General Information    Patient Profile Reviewed yes  -MS     Prior Level of Function independent:;all household mobility;community mobility;ADL's;driving;work  -MS     Existing Precautions/Restrictions fall  -MS     Barriers to Rehab medically complex;physical barrier  -MS       Row Name 24 075          Living Environment    People in Home significant other  -MS       Row Name 24 [facility]          Cognition    Orientation Status (Cognition) oriented x 4  -MS       Row Name 24 [facility]          Safety Issues, Functional Mobility    Impairments Affecting Function (Mobility) balance;coordination;endurance/activity tolerance;motor control;sensation/sensory awareness;strength  -MS               User Key  (r) = Recorded By, (t) = Taken By, (c) = Cosigned By      Initials Name Provider Type    MS Rakel Pollack, PT, DPT, NCS Physical Therapist                   Mobility       Row Name 24 075          Bed Mobility    Comment, (Bed Mobility) pt sitting EOB upon entering the room  -MS       Row Name 24 075           Sit-Stand Transfer    Sit-Stand [place] (Transfers) standby assist  -MS     Comment, (Sit-Stand Transfer) leans to the L  -MS       Row Name 01/17/24 0750          Gait/Stairs (Locomotion)    [place] Level (Gait) contact guard  -MS     Assistive Device (Gait) walker, front-wheeled  -MS     Distance in Feet (Gait) 60ft with ataxic steps of R foot. fatigue after 35ft with R foot toe drag  -MS               User Key  (r) = Recorded By, (t) = Taken By, (c) = Cosigned By      Initials Name Provider Type    MS Rakel Pollack BERE, PT, DPT, NCS Physical Therapist                   Obj/Interventions       Row Name 01/17/24 0750          Range of Motion Comprehensive    General Range of Motion bilateral upper extremity ROM WFL  -MS     Comment, General Range of Motion R UE lags behind, R dorsflexion impaired 75%  -MS       Row Name 01/17/24 0750          Strength Comprehensive (MMT)    Comment, General Manual Muscle Testing (MMT) Assessment R dorsiflexion 2-/5, R hamstring 3+/5, R hip flexion 3+/5  -MS       Row Name 01/17/24 0750          Motor Skills    Motor Skills coordination  -MS     Coordination gross motor deficit;right;upper extremity;lower extremity;minimal impairment;ataxia;dysdiadochokinesia;finger to nose;heel to shin  -MS       Row Name 01/17/24 0750          Balance    Balance Assessment sitting static balance;sitting dynamic balance;standing static balance;standing dynamic balance  -MS     Static Sitting Balance independent  -MS     Dynamic Sitting Balance independent  -MS     Position, Sitting Balance unsupported;sitting edge of bed  -MS     Static Standing Balance contact guard  -MS     Dynamic Standing Balance contact guard  -MS     Position/Device Used, Standing Balance supported;unsupported;walker, rolling  -MS     Comment, Balance static stand: able to stand normal stance with eye open with slight lean to L. Pt able to L-R weight shift with assist with R knee buckling at times.  -MS        Row Name 01/17/24 0750          Sensory Assessment (Somatosensory)    Sensory Assessment (Somatosensory) --  deminished sensation on R UE  -MS               User Key  (r) = Recorded By, (t) = Taken By, (c) = Cosigned By      Initials Name Provider Type    Rakel Banegas, PT, DPT, NCS Physical Therapist                   Goals/Plan    No documentation.                  Clinical Impression       Row Name 01/17/24 0750          Pain    Pretreatment Pain Rating 0/10 - no pain  R LE feels weak and R UE feels slightly weak  -MS     Posttreatment Pain Rating 0/10 - no pain  -MS       Row Name 01/17/24 0750          Plan of Care Review    Plan of Care Reviewed With patient  -MS     Progress improving  -MS     Outcome Evaluation The patient presents alert and oriented x4 lying in bed. He demononstrates mild R UE shoulder weakness and moderate L LE weakness with ataxia of both. He is R UE dominate. He fatigues quickly with ambulation and has a R toe drag due to his R foot drop. He is only able to compensate for about 30ft. He requires use of RW to ambulate safely. He was educated on proper gait mechanics to progress his ambulation safely and coordination. He will benefit from continued PT to work on these however he is discharging home today with no resources for further therapy.  -MS       Row Name 01/17/24 0750          Therapy Assessment/Plan (PT)    Criteria for Skilled Interventions Met (PT) other (see comments)  d/c home today  -MS     Therapy Frequency (PT) evaluation only  -MS       Row Name 01/17/24 0750          Positioning and Restraints    Post Treatment Position chair  -MS     In Chair sitting;call light within reach;encouraged to call for assist  -MS               User Key  (r) = Recorded By, (t) = Taken By, (c) = Cosigned By      Initials Name Provider Type    Rakel Banegas, PT, DPT, NCS Physical Therapist                   Outcome Measures       Row Name 01/17/24 0750 01/16/24 2055       How much  help from another person do you currently need...    Turning from your back to your side while in flat bed without using bedrails? 4  -MS 4  -LD    Moving from lying on back to sitting on the side of a flat bed without bedrails? 4  -MS 4  -LD    Moving to and from a bed to a chair (including a wheelchair)? 3  -MS 4  -LD    Standing up from a chair using your arms (e.g., wheelchair, bedside chair)? 3  -MS 4  -LD    Climbing 3-5 steps with a railing? 3  -MS 3  -LD    To walk in hospital room? 3  -MS 4  -LD    AM-PAC 6 Clicks Score (PT) 20  -MS 23  -LD    Highest Level of Mobility Goal 6 --> Walk 10 steps or more  -MS 7 --> Walk 25 feet or more  -LD      Row Name 01/17/24 0750          Modified [place] Scale    Modified [place] Scale 3 - Moderate disability.  Requiring some help, but able to walk without assistance.  -MS       Row Name 01/17/24 0750          Functional Assessment    Outcome Measure Options AM-PAC 6 Clicks Basic Mobility (PT);Modified [place]  -MS               User Key  (r) = Recorded By, (t) = Taken By, (c) = Cosigned By      Initials Name Provider Type    MS Rakel Pollack, PT, DPT, NCS Physical Therapist    Paulina Davis, RN Registered Nurse                  Physical Therapy Education       Title: PT OT SLP Therapies (In Progress)       Topic: Physical Therapy (In Progress)       Point: Mobility training (Done)       Learning Progress Summary             Patient Acceptance, E, VU by MS at 1/17/2024 0809    Comment: role of PT in his care                         Point: Home exercise program (Not Started)       Learner Progress:  Not documented in this visit.              Point: Body mechanics (Not Started)       Learner Progress:  Not documented in this visit.              Point: Precautions (Not Started)       Learner Progress:  Not documented in this visit.                              User Key       Initials Effective Dates Name Provider Type Discipline    MS 07/11/23 -  Rakel Pollack, PT,  DPT, NCS Physical Therapist PT                  PT Recommendation and Plan     Plan of Care Reviewed With: patient  Progress: improving  Outcome Evaluation: The patient presents alert and oriented x4 lying in bed. He demononstrates mild R UE shoulder weakness and moderate L LE weakness with ataxia of both. He is R UE dominate. He fatigues quickly with ambulation and has a R toe drag due to his R foot drop. He is only able to compensate for about 30ft. He requires use of RW to ambulate safely. He was educated on proper gait mechanics to progress his ambulation safely and coordination. He will benefit from continued PT to work on these however he is discharging home today with no resources for further therapy.     Time Calculation:         PT Charges       Row Name 01/17/24 0750             Time Calculation    Start Time 0750  5 min chart review  -MS      Stop Time 0840  -MS      Time Calculation (min) 50 min  -MS      PT Received On 01/17/24  -MS         Untimed Charges    PT Eval/Re-eval Minutes 55  -MS         Total Minutes    Untimed Charges Total Minutes 55  -MS       Total Minutes 55  -MS                User Key  (r) = Recorded By, (t) = Taken By, (c) = Cosigned By      Initials Name Provider Type    Rakel Banegas, PT, DPT, NCS Physical Therapist                      PT G-Codes  Outcome Measure Options: AM-PAC 6 Clicks Basic Mobility (PT), Modified [place]  AM-PAC 6 Clicks Score (PT): 20  Modified [place] Scale: 3 - Moderate disability.  Requiring some help, but able to walk without assistance.    PT Discharge Summary  Anticipated Discharge Disposition (PT): home with assist    Rakel Pollack, PT, DPT, KASSY  1/17/2024

## 2024-01-17 NOTE — CASE MANAGEMENT/SOCIAL WORK
Continued Stay Note   [place]     Patient Name: Jermaine Howard  MRN: [id number]  Today's Date: 1/17/2024    Admit Date: 1/15/2024        Discharge Plan       Row Name 01/17/24 1254       Plan    Plan Comments Message sent to [employer] to find out status of walker. [employer] states he will deliver walker shortly. Pt meds were sent to [facility] by [facility] for case mgmt assistance. No other dc needs per S/O.      Row Name 01/17/24 1045       Plan    Plan Comments RW ordered from [employer]. SW spoke to S/O to inform of quoted cost ($50.00). They will be paying for rolling walker. Per Vaibhav it will be delivered shortly.    Final Discharge Disposition Code 01 - home or self-care                   Discharge Codes    No documentation.                 Expected Discharge Date and Time       Expected Discharge Date Expected Discharge Time    Jan 17, 2024               YIMI Solares

## 2024-01-17 NOTE — DISCHARGE SUMMARY
[facility] Medicine Services  DISCHARGE SUMMARY     Date of Admission: 1/15/2024  Date of Discharge:  1/17/2024  Primary Care Physician: Provider, No Known    Presenting Problem/History of Present Illness:  Right arm, right leg weakness    Final Discharge Diagnoses:  Active Hospital Problems    Diagnosis     **Acute stroke of right basal ganglia     Primary hypertension     Tobacco use     Alcohol use     Methamphetamine use     Hyperlipidemia, LDL 95        Consults: Dr. Tae Marino, neurology    Procedures Performed: None    Pertinent Test Results:   Results for orders placed during the hospital encounter of 01/15/24    Adult Transthoracic Echo Complete W/ Cont if Necessary Per Protocol (With Agitated Saline)    Interpretation Summary    Left ventricular systolic function is normal. Left ventricular ejection fraction appears to be 51 - 55%.    Left ventricular wall thickness is consistent with mild concentric hypertrophy.    Left ventricular diastolic function was normal.    No evidence of a patent foramen ovale. No evidence of an atrial septal defect present. Saline test results are negative for right to left atrial level shunt.    Trace aortic valve regurgitation is present.      Imaging Results (All)       Procedure Component Value Units Date/Time    CT Angiogram Carotids [[id number]] Collected: 01/16/24 1751     Updated: 01/16/24 1756    Narrative:      EXAMINATION: CT ANGIOGRAM CAROTIDS-      1/16/2024 4:01 PM     HISTORY: CVA; R53.1-Weakness; R41.89-Other symptoms and signs involving  cognitive functions and awareness; R46.89-Other symptoms and signs  involving appearance and behavior     In order to have a CT radiation dose as low as reasonably achievable  Automated Exposure Control was utilized for adjustment of the mA and/or  KV according to patient size.     CT Dose DLP = 381 mGy.cm.  (If there are multiple studies performed at the same time this  represents the  total dose).     CT angiogram carotid arteries.  CT angiography protocol.  CT imaging with bolus IV contrast injection.  Under concurrent supervision axial, sagittal, coronal, and MIP data sets  were constructed on an independent work station.     Mild aortic arch calcification.  Normally patent great vessel origins.     Normal and symmetric common carotid arteries.     Normally patent carotid bifurcations.     There is mild calcified plaque within the proximal LEFT ICA.  No flow limiting stenosis.     Both of the vertebral arteries are patent.  The LEFT vertebral artery is dominant.       Impression:      1. Mild LEFT ICA calcified plaque with no flow-limiting stenosis.  2. No arterial thrombus is seen.     This report was signed and finalized on 1/16/2024 5:53 PM by Dr. Heber Saravia MD.       CT Angiogram Head [[id number]] Collected: 01/16/24 1750     Updated: 01/16/24 1754    Narrative:      EXAMINATION: CT ANGIOGRAM HEAD-      1/16/2024 4:01 PM     HISTORY: stroke; R53.1-Weakness; R41.89-Other symptoms and signs  involving cognitive functions and awareness; R46.89-Other symptoms and  signs involving appearance and behavior     In order to have a CT radiation dose as low as reasonably achievable  Automated Exposure Control was utilized for adjustment of the mA and/or  KV according to patient size.     CT Dose DLP = 381 mGy.cm.  (If there are multiple studies performed at the same time this  represents the total dose).     CT angiogram head with IV contrast.  CT angiography protocol.  CT imaging with bolus IV contrast injection.  Under concurrent supervision axial, sagittal, coronal, and MIP data sets  were constructed on an independent work station.     The distal LEFT vertebral artery is dominant.  Both of the distal vertebral arteries are patent.  The basilar artery is normal.     Normal and symmetric anterior, middle, and posterior cerebral arteries.     No high-grade stenosis, arterial thrombus, or arterial  occlusion is  seen.  No aneurysm is seen.       Impression:      1. Intact [place].  2. No intracranial arterial abnormality is seen.     This report was signed and finalized on 1/16/2024 5:51 PM by Dr. Heber Saravia MD.       MRI Brain Without Contrast [[id number]] Collected: 01/16/24 1116     Updated: 01/16/24 1129    Narrative:      Exam: MRI BRAIN WO CONTRAST- 1/16/2024 9:40 AM     History: CVA; R53.1-Weakness     Technique: Multisequence, multiplanar MRI of the brain was performed  without intravenous contrast.      Contrast: None.     Comparison: CT head 1/15/2024     Findings:      Acute infarct involving the left corona radiata. Minimal associated  vasogenic edema without significant mass effect or hemorrhagic  conversion.     Small left centrum semiovale old lacunar infarct. Presumed remote right  basal ganglia lacunar infarct. Mild presumed superimposed chronic small  vessel ischemic changes. Small focus of susceptibility artifact  involving the right basal ganglia and left thalamus.     Ventricles and extra-axial CSF spaces are normal in size. Major vascular  flow voids are preserved.     Sella/parasellar structures are grossly unremarkable. No tonsillar  ectopia.     Orbits are grossly unremarkable. Minimal right maxillary sinus mucosal  thickening. Mastoid air cells are grossly clear.     No suspicious calvarial or extracranial soft tissue abnormality.       Impression:      Impression:     Acute left corona radiata small infarct. No significant mass effect or  hemorrhagic conversion.     Additional old left corona radiata and right basal ganglia presumed  lacunar infarcts with mild chronic small vessel ischemic changes.     Punctate focus of susceptibility artifact in the right basal ganglia and  left thalamus, likely from remote microhemorrhages, possibly  hypertensive related.        This report was signed and finalized on 1/16/2024 11:26 AM by Braden Perez.       CT Cervical Spine  Without Contrast [[id number]] Collected: 01/15/24 2029     Updated: 01/15/24 2033    Narrative:      EXAMINATION: CT CERVICAL SPINE WO CONTRAST-      1/15/2024 7:13 PM     HISTORY: Fall injury. Neck pain.     In order to have a CT radiation dose as low as reasonably achievable  Automated Exposure Control was utilized for adjustment of the mA and/or  KV according to patient size.     CT Dose DLP = 1160.14 mGy.cm.  (If there are multiple studies performed at the same time this  represents the total dose).     Axial, sagittal, and coronal noncontrast CT imaging.     Vertebral bodies and posterior elements are intact.     Reconstructed sagittal images shows straightening of the normal lordotic  curvature.   There is no malalignment. Prevertebral soft tissues are normal.   Facet joints align normally.     Reconstructed coronal images show normal lateral mass alignment.       Impression:      1. No acute bony abnormality.           This report was signed and finalized on 1/15/2024 8:30 PM by Dr. Heber Saravia MD.       XR Hip With or Without Pelvis 2 - 3 View Right [[id number]] Collected: 01/15/24 2028     Updated: 01/15/24 2032    Narrative:      EXAMINATION: XR HIP W OR WO PELVIS 2-3 VIEW RIGHT-     1/15/2024 7:24 PM     HISTORY: Fall injury. RIGHT hip pain     COMPARISON:  None.     Pelvis and RIGHT hip, 3 views.     The pelvic ring is intact.  No pubic symphysis or sacroiliac joint diastasis.     The proximal RIGHT femur and acetabulum are intact.     Normal appearance of the hip joint space.     Soft tissues are appropriate.     Summary:     1. No acute bony abnormality is seen.           This report was signed and finalized on 1/15/2024 8:29 PM by Dr. Heber Saravia MD.       CT Head Without Contrast [[id number]] Collected: 01/15/24 2027     Updated: 01/15/24 2031    Narrative:      EXAMINATION: CT HEAD WO CONTRAST-      1/15/2024 7:13 PM     HISTORY: Fall injury. RIGHT side weakness.     In order to have a CT  radiation dose as low as reasonably achievable  Automated Exposure Control was utilized for adjustment of the mA and/or  KV according to patient size.     CT Dose DLP = 1160.14 mGy.cm.  (If there are multiple studies performed at the same time this  represents the total dose).     Axial, sagittal, and coronal noncontrast CT imaging of the head.     The visualized paranasal sinuses are clear.     The brain and ventricles have an age appropriate appearance.   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  There is no hemorrhage or mass-effect.   No acute infarction is seen.     No calvarial abnormality.       Impression:      1. No acute intracranial abnormality is seen.           This report was signed and finalized on 1/15/2024 8:28 PM by Dr. Heber Saravia MD.             LAB RESULTS:      Lab 01/17/24  0526 01/15/24  2018   WBC 6.47 6.65   HEMOGLOBIN 15.5 16.7   HEMATOCRIT 45.6 50.7   PLATELETS 214 228   NEUTROS ABS  --  3.98   IMMATURE GRANS (ABS)  --  0.01   LYMPHS ABS  --  1.93   MONOS ABS  --  0.56   EOS ABS  --  0.12   MCV 89.2 91.2   PROTIME  --  12.5   APTT  --  29.2         Lab 01/17/24  0526 01/16/24  0530 01/15/24  2018   SODIUM 141  --  142   POTASSIUM 3.6  --  3.5   CHLORIDE 107  --  103   CO2 24.0  --  30.0*   ANION GAP 10.0  --  9.0   BUN 9  --  9   CREATININE 0.81  --  0.72*   EGFR 105.4  --  109.2   GLUCOSE 101*  --  84   CALCIUM 8.6  --  9.6   MAGNESIUM  --   --  2.0   HEMOGLOBIN A1C  --  5.60  --    TSH  --   --  0.819         Lab 01/17/24  0526 01/15/24  2018   TOTAL PROTEIN 6.3 7.5   ALBUMIN 3.8 4.4   GLOBULIN 2.5 3.1   ALT (SGPT) 11 15   AST (SGOT) 18 20   BILIRUBIN 0.4 0.4   ALK PHOS 72 88   LIPASE  --  29         Lab 01/15/24  2018   HSTROP T 9   PROTIME 12.5   INR 0.92         Lab 01/15/24  2018   CHOLESTEROL 160   LDL CHOL 95   HDL CHOL 40   TRIGLYCERIDES 143         Lab 01/15/24  2122   VITAMIN B 12 683         Brief  Urine Lab Results  (Last result in the past 365 days)        Color   Clarity   Blood   Leuk Est   Nitrite   Protein   CREAT   Urine HCG        01/15/24 2212 Yellow   Clear   Negative   Negative   Negative   Negative                 Microbiology Results (last 10 days)       ** No results found for the last 240 hours. **            Hospital Course: Mr. Howard presented to [facility] emergency room 1/15/2024 reporting unable to stand with right arm and right lower extremity weakness.  Patient reported 1/13/2024 he was working on remodeling home and noted right arm and right leg weakness around 2300 while in the shower.  He reported sudden onset of right leg weakness and fell to the floor.  He reported difficulty getting up.  In addition, he noted right arm weakness.  He denied speech difficulty, facial weakness or altered speech.  Reported initially he was having to use his left hand to move his right arm.  On examination he was noted to have right upper extremity right lower extremity weakness with discoordination of the right upper extremity.  CT head and neck no acute intracranial abnormality.  X-ray pelvis no acute abnormality.  CT cervical spine no acute bony abnormality.  Normal saline fluid bolus, folic acid, aspirin, statin given in ER.    He was admitted to the neurology floor with acute right basal ganglia stroke.  He presented with right arm, right leg weakness.  MRI positive for right basal ganglia stroke.  Echo showed EF 51-55% with no PFO.  No evidence of atrial septal defect.  Saline test negative for right-to-left shunt.  CTA carotids noted mild left ICA calcified plaque with no flow-limiting stenosis.  No arterial thrombus.  CTA head noted intact [place].  No intracranial arterial abnormality seen.  Aspirin, statin started on admission.    Neurology consulted and he was seen by Dr. Tae Marino with impression acute right basal ganglia stroke likely lacunar stroke with small  vessel disease.  Continue aspirin, atorvastatin.  Zio patch at discharge.  SBP goal less than 140.  Urine drug screen positive for methamphetamines and amphetamines.    Physical therapy, Occupational Therapy, speech therapy consulted.  Physical therapy noted right upper extremity, right lower extremity weakness.  Fatigues quickly with ambulation due to right toe drag due to right foot drop.  Patient able to compensate for 30 feet.  He used rolling walker to ambulate safely.  Patient educated on proper gait mechanics to progress his ambulation safely and coordination.  Patient would benefit from continued physical therapy to work as an outpatient but patient has no source of income for further therapy after discharge.    LDL 95 with LDL goal less than 70.  Lipitor 80 mg orally nightly ordered.  Hemoglobin A1c 5.6.    Patient presented with hypertension.  Blood pressure 172/100.  Blood pressure trended down to 144/79.  Per neurology, systolic blood pressure goal less than 140 now that patient 3 to 4 days from onset of symptoms.  Losartan 25 mg orally daily started.  Blood pressure 138/72 at discharge.  Patient has been advised to purchase blood pressure cuff and monitor blood pressure to take blood pressure log to follow-up appointment with Dr. Wayne for blood pressure medication adjustment.    Discussed smoking cessation.  Urine drug screen positive for methamphetamines.  Discussed with patient to discontinue.    Patient admits to drinking 2 whiskey drinks daily.  No signs of shaking or alcohol withdrawal.  Discussed alcohol cessation.  Prophylactic folic acid and thiamine ordered.    SCDs ordered for deep vein thrombosis prophylaxis.    On 1/17/2024, he has been evaluated by neurology today.  Right arm and right leg are stronger.  He was evaluated by physical therapy who recommended continued therapy but unfortunately, patient moved from [place] to this area 4 months ago and has no health insurance.  Social  "service has provided assistance with Medicaid.  Per neurology aspirin 81 mg orally daily, Lipitor 80 mg orally nightly for LDL goal less than 70.  Systolic blood pressure goal less than 140.  Patient has been counseled to purchase blood pressure cuff and monitor blood pressures outpatient and keep a log for primary care provider.  Discussed smoking cessation and illicit drug use cessation.  Discussed alcohol cessation.  Patient will follow-up with neurology in 4 weeks, 2/20/2024.  We have arranged follow-up with Dr. Talib Wayne on 1/22/2024 to establish care as new patient.  Meds to beds ordered.  Patient has been advised not to drive secondary to right lower extremity weakness and no climbing on ladders or stepstools or other activities as he is at fall risk.     Physical Exam on Discharge:  /72 (BP Location: Right arm, Patient Position: Lying)   Pulse 73   Temp 97.5 °F (36.4 °C) (Oral)   Resp 18   Ht 162.6 cm (64\")   Wt 74.8 kg (165 lb)   SpO2 97%   BMI 28.32 kg/m²   Physical Exam  Vitals and nursing note reviewed.   HENT:      Head: Normocephalic and atraumatic.      Nose: No congestion.      Mouth/Throat:      Pharynx: Oropharynx is clear. No oropharyngeal exudate or posterior oropharyngeal erythema.   Eyes:      Extraocular Movements: Extraocular movements intact.      Pupils: Pupils are equal, round, and reactive to light.   Cardiovascular:      Rate and Rhythm: Normal rate and regular rhythm.      Heart sounds: No murmur heard.  Pulmonary:      Breath sounds: No wheezing, rhonchi or rales.      Comments: No oxygen use  Abdominal:      Palpations: Abdomen is soft.      Tenderness: There is no abdominal tenderness.   Genitourinary:     Comments: Voiding.  Musculoskeletal:         General: No swelling or tenderness.      Cervical back: Normal range of motion and neck supple.   Skin:     General: Skin is warm and dry.   Neurological:      Mental Status: He is alert and oriented to person, place, " and time.      Comments: Right upper extremity weakness improved.  Finger to nose improved.  Slight uncoordinated.  Right lower extremity weak but able to ambulate with walker and picking foot up.  Mild right foot drop.   Psychiatric:         Mood and Affect: Mood normal.         Behavior: Behavior normal.         Thought Content: Thought content normal.         Judgment: Judgment normal.       Condition on Discharge: Stable for discharge home with family    Discharge Disposition:  Home or Self Care    Discharge Medications:     Discharge Medications        New Medications        Instructions Start Date   aspirin 81 MG chewable tablet   81 mg, Oral, Daily   Start Date: January 18, 2024     atorvastatin 80 MG tablet  Commonly known as: LIPITOR   80 mg, Oral, Nightly      losartan 25 MG tablet  Commonly known as: COZAAR   25 mg, Oral, Every 24 Hours Scheduled   Start Date: January 18, 2024              Discharge Diet:   Diet Instructions       Diet: Regular/House Diet; Regular Texture (IDDSI 7); Thin (IDDSI 0)      Discharge Diet: Regular/House Diet    Texture: Regular Texture (IDDSI 7)    Fluid Consistency: Thin (IDDSI 0)          Activity at Discharge:   Activity Instructions       Other Activity Instructions      Activity Instructions: Patient not drive due to RLE weakness and no climbing on ladders or step stools and other activities as he is fall risk.          Discharge instructions:  1.  For worsening right-sided weakness seek medical attention  2.  Aspirin 81 mg orally daily  3.  Atorvastatin 80 mg orally nightly  4.  Losartan 25 mg orally daily  5.  Follow-up with neurology 4 weeks, 2/20/2024  6.  Follow-up with Dr. Talib Wayne 1/22/2024 1 week to establish care as new patient.  7.  Walker for safety.  8.  No climbing, stepstools or ladders at risk for fall per neurology.  No driving due to right lower extremity weakness.  No driving until cleared by neurology.    Follow-up Appointments:   Future  Appointments   Date Time Provider Department Center   1/22/2024  2:00 PM NATIVIDAD Wayne MD [facility]   2/20/2024  2:30 PM Harris Flores APRN [facility]       Test Results Pending at Discharge: None    Electronically signed by ALLYSON Fregoso, 01/17/24, 09:43 CST.    Time: 35 minutes.  Discussed with Dr. Petty, Mary Lama, ALLYSON neurology, and patient.    The above documentation resulted from a face-to-face encounter by me Analy VALADEZ, [facility].

## 2024-01-18 ENCOUNTER — TRANSITIONAL CARE MANAGEMENT TELEPHONE ENCOUNTER (OUTPATIENT)
Dept: CALL CENTER | Facility: HOSPITAL | Age: 54
End: 2024-01-18
Payer: MEDICAID

## 2024-01-18 NOTE — OUTREACH NOTE
Call Center TCM Note      Flowsheet Row Responses   [facility] facility patient discharged from? [place]   Does the patient have one of the following disease processes/diagnoses(primary or secondary)? Stroke   TCM attempt successful? No   Unsuccessful attempts Attempt 1            Velia Butt RN    1/18/2024, 13:27 CST

## 2024-01-18 NOTE — OUTREACH NOTE
Call Center TCM Note      Flowsheet Row Responses   [facility] patient discharged from? [place]   Does the patient have one of the following disease processes/diagnoses(primary or secondary)? Stroke   TCM attempt successful? No   Unsuccessful attempts Attempt 2   Comments HOSP DC FU appt 1/22/24 2 pm   Does the patient have an appointment with their PCP within 7-14 days of discharge? Yes            Velia Butt RN    1/18/2024, 13:50 CST

## 2024-01-18 NOTE — PAYOR COMM NOTE
"Jermaine Stevens (53 y.o. Male) [id number]   admit, 01/15 , DC 01/17,  [facility] [phone]  [phone].   Date of Birth   1970    Social Security Number       Address   [address] [place] [address]    Home Phone   [phone]    MRN   [id number]       Pent[employer]al   Other    Marital Status                               Admission Date   1/15/24    Admission Type   Emergency    Admitting Provider   Vaibhav Petty MD    Attending Provider       Department, Room/Bed   [facility] 3A, 358/1       Discharge Date   1/17/2024    Discharge Disposition   Home or Self Care    Discharge Destination                                 Attending Provider: (none)   Allergies: No Known Allergies    Isolation: None   Infection: None   Code Status: Prior    Ht: 162.6 cm (64\")   Wt: 74.8 kg (165 lb)    Admission Cmt: None   Principal Problem: Acute stroke of right basal ganglia [I63.9]                   Active Insurance as of 1/15/2024       Primary Coverage       Payor Plan Insurance Group Employer/Plan Group    [facility] COMMUNITY PLAN [facility] COMMUNITY PLAN [facility]        Payor Plan Address Payor Plan Phone Number Payor Plan Fax Number Effective Dates    [address]   1/1/2024 - None Entered    [facility] [phone]         Subscriber Name Subscriber Birth Date Member ID       JERMAINE STEVENS 1970 [id number]                     Emergency Contacts        (Rel.) Home Phone Work Phone Mobile Phone    Linda Espinoza (Significant Other) [phone]                 History & Physical        Manisha Painting DO at 01/15/24 2133              [facility] Medicine Services  HISTORY AND PHYSICAL    Date of Admission: 1/15/2024  Primary Care Physician: Provider, No Known    Subjective   Primary Historian: Patient     Chief Complaint:     Hip Pain     Arm Pain   Pertinent negatives include no chest " pain.   53-year-old male who presents emergency department because he is unable to stand up.  He got dizzy yesterday and fell.  His symptoms started yesterday at 11 PM.  He states he got in the shower and when he was in the shower around 11 his right leg gave out on him.  He got out of the shower and went to bed, and has been in bed since that time.  He is unable to move his right leg.  When he tries to stand on it the leg will just give out.  He does note chronic back pain.  He notes no sensation changes.  He is able to give history without any difficulty.  I do not notice any dysarthria.  He states he is very fearful of doctors and hospitals, and does not follow regularly with a physician.  On exam, it is noted that the patient has right upper extremity and right lower extremity weakness.  He has discoordination with the right upper extremity.  He does not seem to have any sensation loss.        Review of Systems   Constitutional:  Negative for chills and fever.   Respiratory:  Negative for cough and shortness of breath.    Cardiovascular:  Negative for chest pain and leg swelling.   Gastrointestinal:  Negative for constipation, diarrhea and nausea.   Musculoskeletal:  Positive for arthralgias and gait problem.      Otherwise complete ROS reviewed and negative except as mentioned in the HPI.    Past Medical History: History reviewed. No pertinent past medical history.    Past Surgical History:History reviewed. No pertinent surgical history.    Social History:  reports that he has been smoking cigarettes. He has been smoking an average of 1 pack per day. He does not have any smokeless tobacco history on file. He reports current alcohol use. He reports that he does not currently use drugs after having used the following drugs: Methamphetamines.    Family History: family history is not on file.       Allergies:  No Known Allergies    Medications:  Prior to Admission medications    Not on File     I have utilized all  "available immediate resources to obtain, update, or review the patient's current medications (including all prescriptions, over-the-counter products, herbals, cannabis/cannabidiol products, and vitamin/mineral/dietary (nutritional) supplements).    Objective     Vital Signs: BP (!) 184/102   Pulse 76   Temp 97.7 °F (36.5 °C) (Oral)   Resp 20   Ht 162.6 cm (64\")   Wt 74.8 kg (165 lb)   SpO2 98%   BMI 28.32 kg/m²   Physical Exam  Constitutional:       Appearance: He is not ill-appearing.   HENT:      Head: Normocephalic and atraumatic.      Comments: Poor dentition      Right Ear: External ear normal.      Left Ear: External ear normal.      Nose: Nose normal. No congestion.   Eyes:      General: No scleral icterus.     Extraocular Movements: Extraocular movements intact.   Cardiovascular:      Rate and Rhythm: Normal rate and regular rhythm.   Pulmonary:      Effort: Pulmonary effort is normal. No respiratory distress.   Abdominal:      General: Abdomen is flat. There is no distension.   Musculoskeletal:         General: No swelling. Normal range of motion.      Comments: Right upper and lower extremity weakness.    Skin:     General: Skin is warm.      Coloration: Skin is not jaundiced.   Neurological:      Mental Status: He is alert and oriented to person, place, and time.      Cranial Nerves: No cranial nerve deficit.      Sensory: No sensory deficit.      Motor: Weakness present.      Coordination: Coordination abnormal.      Gait: Gait abnormal.   Psychiatric:         Mood and Affect: Mood normal.         Behavior: Behavior normal.          Results Reviewed:  Lab Results (last 24 hours)       Procedure Component Value Units Date/Time    Vitamin B12 [[id number]] Collected: 01/15/24 2122    Specimen: Blood Updated: 01/15/24 2127    Comprehensive Metabolic Panel [[id number]]  (Abnormal) Collected: 01/15/24 2018    Specimen: Blood Updated: 01/15/24 2052     Glucose 84 mg/dL      BUN 9 mg/dL      Creatinine 0.72 " mg/dL      Sodium 142 mmol/L      Potassium 3.5 mmol/L      Chloride 103 mmol/L      CO2 30.0 mmol/L      Calcium 9.6 mg/dL      Total Protein 7.5 g/dL      Albumin 4.4 g/dL      ALT (SGPT) 15 U/L      AST (SGOT) 20 U/L      Alkaline Phosphatase 88 U/L      Total Bilirubin 0.4 mg/dL      Globulin 3.1 gm/dL      A/G Ratio 1.4 g/dL      BUN/Creatinine Ratio 12.5     Anion Gap 9.0 mmol/L      eGFR 109.2 mL/min/1.73     Narrative:      GFR Normal >60  Chronic Kidney Disease <60  Kidney Failure <15      Lipase [[id number]]  (Normal) Collected: 01/15/24 2018    Specimen: Blood Updated: 01/15/24 2052     Lipase 29 U/L     Single High Sensitivity Troponin T [[id number]]  (Normal) Collected: 01/15/24 2018    Specimen: Blood Updated: 01/15/24 2052     HS Troponin T 9 ng/L     Narrative:      High Sensitive Troponin T Reference Range:  <14.0 ng/L- Negative Female for AMI  <22.0 ng/L- Negative Male for AMI  >=14 - Abnormal Female indicating possible myocardial injury.  >=22 - Abnormal Male indicating possible myocardial injury.   Clinicians would have to utilize clinical acumen, EKG, Troponin, and serial changes to determine if it is an Acute Myocardial Infarction or myocardial injury due to an underlying chronic condition.         Ethanol [[id number]] Collected: 01/15/24 2018    Specimen: Blood Updated: 01/15/24 2052     Ethanol % <0.010 %     Narrative:      Not for legal purposes. Chain of Custody not followed.     TSH [[id number]]  (Normal) Collected: 01/15/24 2018    Specimen: Blood Updated: 01/15/24 2052     TSH 0.819 uIU/mL     T4, Free [[id number]]  (Normal) Collected: 01/15/24 2018    Specimen: Blood Updated: 01/15/24 2052     Free T4 1.34 ng/dL     Narrative:      Results may be falsely increased if patient taking Biotin.      Magnesium [[id number]]  (Normal) Collected: 01/15/24 2018    Specimen: Blood Updated: 01/15/24 2052     Magnesium 2.0 mg/dL     Lipid Panel [[id number]] Collected: 01/15/24 2018    Specimen: Blood  Updated: 01/15/24 2052     Total Cholesterol 160 mg/dL      Triglycerides 143 mg/dL      HDL Cholesterol 40 mg/dL      LDL Cholesterol  95 mg/dL      VLDL Cholesterol 25 mg/dL      LDL/HDL Ratio 2.29    Narrative:      Cholesterol Reference Ranges  (U.S. Department of Health and Human Services ATP III Classifications)    Desirable          <200 mg/dL  Borderline High    200-239 mg/dL  High Risk          >240 mg/dL      Triglyceride Reference Ranges  (U.S. Department of Health and Human Services ATP III Classifications)    Normal           <150 mg/dL  Borderline High  150-199 mg/dL  High             200-499 mg/dL  Very High        >500 mg/dL    HDL Reference Ranges  (U.S. Department of Health and Human Services ATP III Classifications)    Low     <40 mg/dl (major risk factor for CHD)  High    >60 mg/dl ('negative' risk factor for CHD)        LDL Reference Ranges  (U.S. Department of Health and Human Services ATP III Classifications)    Optimal          <100 mg/dL  Near Optimal     100-129 mg/dL  Borderline High  130-159 mg/dL  High             160-189 mg/dL  Very High        >189 mg/dL    Protime-INR [[id number]]  (Normal) Collected: 01/15/24 2018    Specimen: Blood Updated: 01/15/24 2043     Protime 12.5 Seconds      INR 0.92    aPTT [[id number]]  (Normal) Collected: 01/15/24 2018    Specimen: Blood Updated: 01/15/24 2043     PTT 29.2 seconds     CBC & Differential [[id number]]  (Normal) Collected: 01/15/24 2018    Specimen: Blood Updated: 01/15/24 2026    Narrative:      The following orders were created for panel order CBC & Differential.  Procedure                               Abnormality         Status                     ---------                               -----------         ------                     CBC Auto Differential[[id number]]        Normal              Final result                 Please view results for these tests on the individual orders.    CBC Auto Differential [[id number]]  (Normal) Collected:  01/15/24 2018    Specimen: Blood Updated: 01/15/24 2026     WBC 6.65 10*3/mm3      RBC 5.56 10*6/mm3      Hemoglobin 16.7 g/dL      Hematocrit 50.7 %      MCV 91.2 fL      MCH 30.0 pg      MCHC 32.9 g/dL      RDW 12.8 %      RDW-SD 43.0 fl      MPV 10.8 fL      Platelets 228 10*3/mm3      Neutrophil % 59.8 %      Lymphocyte % 29.0 %      Monocyte % 8.4 %      Eosinophil % 1.8 %      Basophil % 0.8 %      Immature Grans % 0.2 %      Neutrophils, Absolute 3.98 10*3/mm3      Lymphocytes, Absolute 1.93 10*3/mm3      Monocytes, Absolute 0.56 10*3/mm3      Eosinophils, Absolute 0.12 10*3/mm3      Basophils, Absolute 0.05 10*3/mm3      Immature Grans, Absolute 0.01 10*3/mm3      nRBC 0.0 /100 WBC           Imaging Results (Last 24 Hours)       Procedure Component Value Units Date/Time    CT Cervical Spine Without Contrast [[id number]] Collected: 01/15/24 2029     Updated: 01/15/24 2033    Narrative:      EXAMINATION: CT CERVICAL SPINE WO CONTRAST-      1/15/2024 7:13 PM     HISTORY: Fall injury. Neck pain.     In order to have a CT radiation dose as low as reasonably achievable  Automated Exposure Control was utilized for adjustment of the mA and/or  KV according to patient size.     CT Dose DLP = 1160.14 mGy.cm.  (If there are multiple studies performed at the same time this  represents the total dose).     Axial, sagittal, and coronal noncontrast CT imaging.     Vertebral bodies and posterior elements are intact.     Reconstructed sagittal images shows straightening of the normal lordotic  curvature.   There is no malalignment. Prevertebral soft tissues are normal.   Facet joints align normally.     Reconstructed coronal images show normal lateral mass alignment.       Impression:      1. No acute bony abnormality.           This report was signed and finalized on 1/15/2024 8:30 PM by Dr. Heber Saravia MD.       XR Hip With or Without Pelvis 2 - 3 View Right [[id number]] Collected: 01/15/24 2028     Updated: 01/15/24 2032     Narrative:      EXAMINATION: XR HIP W OR WO PELVIS 2-3 VIEW RIGHT-     1/15/2024 7:24 PM     HISTORY: Fall injury. RIGHT hip pain     COMPARISON:  None.     Pelvis and RIGHT hip, 3 views.     The pelvic ring is intact.  No pubic symphysis or sacroiliac joint diastasis.     The proximal RIGHT femur and acetabulum are intact.     Normal appearance of the hip joint space.     Soft tissues are appropriate.     Summary:     1. No acute bony abnormality is seen.           This report was signed and finalized on 1/15/2024 8:29 PM by Dr. Heber Saravia MD.       CT Head Without Contrast [[id number]] Collected: 01/15/24 2027     Updated: 01/15/24 2031    Narrative:      EXAMINATION: CT HEAD WO CONTRAST-      1/15/2024 7:13 PM     HISTORY: Fall injury. RIGHT side weakness.     In order to have a CT radiation dose as low as reasonably achievable  Automated Exposure Control was utilized for adjustment of the mA and/or  KV according to patient size.     CT Dose DLP = 1160.14 mGy.cm.  (If there are multiple studies performed at the same time this  represents the total dose).     Axial, sagittal, and coronal noncontrast CT imaging of the head.     The visualized paranasal sinuses are clear.     The brain and ventricles have an age appropriate appearance.   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  There is no hemorrhage or mass-effect.   No acute infarction is seen.     No calvarial abnormality.       Impression:      1. No acute intracranial abnormality is seen.           This report was signed and finalized on 1/15/2024 8:28 PM by Dr. Heber Saravia MD.             I have personally reviewed and interpreted the radiology studies and ECG obtained at time of admission.     Assessment / Plan   Assessment:   Active Hospital Problems    Diagnosis     **CVA (cerebral vascular accident)      Impression:  CVA  Hypertension  Chronic low back pain  Poor  dentition    Treatment Plan  Stroke protocol orders  Follow stroke protocol orders for elevated blood pressure  Fall precautions  MRI of the brain in the morning  Neurology consult  CTA of the carotids  Cardiac echo  8.   Follow-up labs in the morning    The patient will be admitted to my service here at [facility].  Primary team to take over in the morning    Medical Decision Making  Number and Complexity of problems: 4, moderate  Differential Diagnosis: Lumbar stenosis    Conditions and Status        Condition is unchanged.     MDM Data  External documents reviewed: None  Cardiac tracing (EKG, telemetry) interpretation: None  Radiology interpretation: None  Labs reviewed: Reviewed  Any tests that were considered but not ordered: None     Decision rules/scores evaluated (example [id number], Wells, etc): None     Discussed with: Patient and family at bedside     Care Planning  Shared decision making: Patient, family, and ED staff  Code status and discussions: Full    Disposition  Social Determinants of Health that impact treatment or disposition: None  Estimated length of stay is overnight.     I confirmed that the patient's advanced care plan is present, code status is documented, and a surrogate decision maker is listed in the patient's medical record.     The patient's surrogate decision maker is family.     The patient was seen and examined by me on 1/15/2024 at 9.    Electronically signed by Manisha Painting DO, 01/15/24, 21:33 CST.                Electronically signed by Manisha Painting DO at 01/15/24 7634          Emergency Department Notes        Aram Payne DO at 01/15/24 2010          Subjective   History of Present Illness  Pt presents to the EC with report of fall at home - presented with report of R arm/hip pain.  Pt states he fell 2d ago - felt like his leg gave out.  He states that he had some weakness in R arm and leg at that time but it resolved after a short time.   He then fell again in the shower - states felt like his leg wasn't working right/couldn't hold him - states that his arm was also weak and had some numbness.  Denies any neck pain.  Has some lower back pain which is his baseline - has not worsened.  Denies any speech/vision changes.  No CP/SOB. Denies any incontinence.  No cough/congestion.  Pt admits to drinking etoh daily and h/o methamphetamine use.          Review of Systems   Constitutional:  Negative for chills and fever.   HENT:  Negative for trouble swallowing and voice change.    Eyes:  Negative for photophobia and visual disturbance.   Respiratory:  Negative for chest tightness and shortness of breath.    Cardiovascular:  Negative for chest pain, palpitations and leg swelling.   Gastrointestinal:  Negative for abdominal pain, diarrhea and vomiting.   Genitourinary:  Negative for dysuria.   Musculoskeletal:  Positive for back pain. Negative for neck pain.        + R hip pain, + R shoulder discomfort   Neurological:  Positive for weakness. Negative for syncope, light-headedness and headaches.   Psychiatric/Behavioral:  Negative for confusion.    All other systems reviewed and are negative.      History reviewed. No pertinent past medical history.    No Known Allergies    History reviewed. No pertinent surgical history.    History reviewed. No pertinent family history.    Social History     Tobacco Use    Smoking status: Every Day     Packs/day: 1     Types: Cigarettes   Substance and Sexual Activity    Alcohol use: Yes     Comment: Whiskey -- everyday    Drug use: Not Currently     Types: Methamphetamines     Comment: 1 WEEK AGO           Objective   Physical Exam  Vitals and nursing note reviewed.   Constitutional:       General: He is not in acute distress.     Appearance: Normal appearance.   HENT:      Head: Normocephalic and atraumatic.      Nose: Nose normal.      Mouth/Throat:      Mouth: Mucous membranes are moist.   Eyes:      Extraocular Movements:  Extraocular movements intact.      Conjunctiva/sclera: Conjunctivae normal.      Pupils: Pupils are equal, round, and reactive to light.   Cardiovascular:      Rate and Rhythm: Normal rate and regular rhythm.      Pulses: Normal pulses.      Heart sounds: Normal heart sounds.   Pulmonary:      Effort: Pulmonary effort is normal.      Breath sounds: Normal breath sounds.   Abdominal:      General: Abdomen is flat. Bowel sounds are normal.      Palpations: Abdomen is soft.   Musculoskeletal:         General: No swelling or signs of injury.      Cervical back: Normal range of motion and neck supple. No rigidity or tenderness.   Skin:     General: Skin is warm and dry.      Capillary Refill: Capillary refill takes less than 2 seconds.   Neurological:      Mental Status: He is alert and oriented to person, place, and time.      Cranial Nerves: No cranial nerve deficit.      Comments: + weakness to RUE RLE compared to L side.  No pronator drift.  Mild tremor noted. DTRs 1/4 bilat patellar   Psychiatric:         Mood and Affect: Mood normal.         Procedures          ED Course      Labs Reviewed   COMPREHENSIVE METABOLIC PANEL - Abnormal; Notable for the following components:       Result Value    Creatinine 0.72 (*)     CO2 30.0 (*)     All other components within normal limits    Narrative:     GFR Normal >60  Chronic Kidney Disease <60  Kidney Failure <15     PROTIME-INR - Normal   APTT - Normal   LIPASE - Normal   SINGLE HSTROPONIN T - Normal    Narrative:     High Sensitive Troponin T Reference Range:  <14.0 ng/L- Negative Female for AMI  <22.0 ng/L- Negative Male for AMI  >=14 - Abnormal Female indicating possible myocardial injury.  >=22 - Abnormal Male indicating possible myocardial injury.   Clinicians would have to utilize clinical acumen, EKG, Troponin, and serial changes to determine if it is an Acute Myocardial Infarction or myocardial injury due to an underlying chronic condition.        TSH - Normal   T4,  FREE - Normal    Narrative:     Results may be falsely increased if patient taking Biotin.     MAGNESIUM - Normal   CBC WITH AUTO DIFFERENTIAL - Normal   ETHANOL    Narrative:     Not for legal purposes. Chain of Custody not followed.    LIPID PANEL    Narrative:     Cholesterol Reference Ranges  (U.S. Department of Health and Human Services ATP III Classifications)    Desirable          <200 mg/dL  Borderline High    200-239 mg/dL  High Risk          >240 mg/dL      Triglyceride Reference Ranges  (U.S. Department of Health and Human Services ATP III Classifications)    Normal           <150 mg/dL  Borderline High  150-199 mg/dL  High             200-499 mg/dL  Very High        >500 mg/dL    HDL Reference Ranges  (U.S. Department of Health and Human Services ATP III Classifications)    Low     <40 mg/dl (major risk factor for CHD)  High    >60 mg/dl ('negative' risk factor for CHD)        LDL Reference Ranges  (U.S. Department of Health and Human Services ATP III Classifications)    Optimal          <100 mg/dL  Near Optimal     100-129 mg/dL  Borderline High  130-159 mg/dL  High             160-189 mg/dL  Very High        >189 mg/dL   URINALYSIS W/ MICROSCOPIC IF INDICATED (NO CULTURE)   URINE DRUG SCREEN   VITAMIN B12   CBC AND DIFFERENTIAL    Narrative:     The following orders were created for panel order CBC & Differential.  Procedure                               Abnormality         Status                     ---------                               -----------         ------                     CBC Auto Differential[[id number]]        Normal              Final result                 Please view results for these tests on the individual orders.     XR Hip With or Without Pelvis 2 - 3 View Right   Final Result      CT Cervical Spine Without Contrast   Final Result   1. No acute bony abnormality.               This report was signed and finalized on 1/15/2024 8:30 PM by Dr. Heber Saravia MD.          CT Head  Without Contrast   Final Result   1. No acute intracranial abnormality is seen.               This report was signed and finalized on 1/15/2024 8:28 PM by Dr. Heber Saravia MD.                                                     Medical Decision Making  Pt stable in EC - NAD att.  No evid of ICH/mass.  No evid of SBI/sepsis.  Pt with hx s/o possible stroke affecting R UE/LE.  Given distribution, this does not appear c/w Wernicke's/Beri Beri.  Given dose of thiamine/folate.  NIHSS 0.  Symptoms present > 24hrs - not candidate for tpa.  Have d/w Dr. Painting for admit/further evaluation for stroke.     Amount and/or Complexity of Data Reviewed  Labs: ordered.  Radiology: ordered.        Final diagnoses:   Acute right-sided weakness       ED Disposition  ED Disposition       ED Disposition   Decision to Admit    Condition   --    Comment   --               No follow-up provider specified.       Medication List      No changes were made to your prescriptions during this visit.            Aram Payne DO  01/15/24 2015       Aram Payne DO  01/15/24 2109      Electronically signed by Aram Payne DO at 01/15/24 2109       Vital Signs (last 2 days) before discharge       Date/Time Temp Temp src Pulse Resp BP Patient Position SpO2    01/17/24 0326 97.5 (36.4) Oral 73 18 138/72 Lying 97    01/16/24 2355 98 (36.7) Oral 70 18 143/85 Lying 97    01/16/24 2059 97.8 (36.6) Oral 63 16 160/81 Lying 99    01/16/24 1614 97.9 (36.6) Oral 67 18 144/79 Lying 97    01/16/24 1500 -- -- 83 -- 138/84 -- 97    01/16/24 1400 -- -- 79 -- 132/75 -- 96    01/16/24 1305 -- -- 77 -- 149/81 -- 93    01/16/24 0900 -- -- 66 -- 134/86 -- 95    01/16/24 0800 97.6 (36.4) Oral 65 21 146/82 Lying 96    01/16/24 0700 -- -- 68 -- 161/91 -- 96    01/16/24 0501 -- -- 58 -- 171/87 -- 97    01/16/24 0401 -- -- 69 -- 174/100 -- 95    01/16/24 0301 -- -- 57 -- 159/93 -- 98    01/16/24 0201 -- -- 58 -- 147/86 -- --    01/16/24  0101 -- -- 55 -- 163/96 -- 95    01/16/24 0001 -- -- 63 -- 173/93 -- 95    01/15/24 2316 -- -- 68 -- 182/94 -- 97    01/15/24 2216 -- -- 73 -- 161/101 -- 97    01/15/24 2146 -- -- 71 -- 176/98 -- 97    01/15/24 2116 -- -- 76 -- 184/102 -- 98    01/15/24 2046 -- -- 75 -- 172/103 -- 95    01/15/24 1958 97.7 (36.5) Oral 87 20 177/100 Sitting 98          No current facility-administered medications for this encounter.     Current Outpatient Medications   Medication Sig Dispense Refill    aspirin 81 MG chewable tablet Chew 1 tablet Daily. 30 tablet 1    atorvastatin (LIPITOR) 80 MG tablet Take 1 tablet by mouth Every Night. 30 tablet 1    losartan (COZAAR) 25 MG tablet Take 1 tablet by mouth Daily. 30 tablet 1        Physician Progress Notes (last 72 hours)        Tari Lama APRN at 01/17/24 0910       Attestation signed by Lor Hernandez MD at 01/17/24 1839    I have reviewed this documentation and agree.  At this time, neurology will sign-off. Please feel free to call us back if we can be of any further assistance.                    Neurology Progress Note      Chief Complaint:  f/u stroke  Length of Stay:  2   Subjective     Subjective:  Lying in bed. No family/visitors at bedside. He tells me he moved to [place] from [place] about 4 months ago. He feels like right arm and leg are stronger. He has no health insurance. He was seen by PT this AM who has recommended continued therapy but again no health insurance. He has right foot drop so makes him a fall risk.     Medications:  Current Facility-Administered Medications   Medication Dose Route Frequency Provider Last Rate Last Admin    acetaminophen (TYLENOL) tablet 650 mg  650 mg Oral Q6H PRN Manisha Painting,         aspirin tablet 325 mg  325 mg Oral Daily Manisha Painting DO   325 mg at 01/16/24 0917    Or    aspirin suppository 300 mg  300 mg Rectal Daily Manisha Painting DO        atorvastatin (LIPITOR) tablet 80 mg  80 mg Oral Nightly Manisha Painting  DO Frances   80 mg at 01/16/24 2103    folic acid 1 mg in sodium chloride 0.9 % 50 mL IVPB  1 mg Intravenous Daily Aram Payne DO   Stopped at 01/16/24 0947    losartan (COZAAR) tablet 25 mg  25 mg Oral Q24H Analy Hinkle APRN   25 mg at 01/16/24 1754    ondansetron (ZOFRAN) injection 4 mg  4 mg Intravenous Q6H PRN Manisha Painting DO        sodium chloride 0.9 % flush 10 mL  10 mL Intravenous Q12H Manisha Painting DO   10 mL at 01/16/24 2103    sodium chloride 0.9 % flush 10 mL  10 mL Intravenous PRN Manisha Painting DO        sodium chloride 0.9 % infusion 40 mL  40 mL Intravenous PRN Manisha Painting DO        thiamine (B-1) 100 mg in sodium chloride 0.9 % 100 mL IVPB  100 mg Intravenous Daily Aram Payne DO   Stopped at 01/16/24 1015             Objective      Vital Signs  Temp:  [97.5 °F (36.4 °C)-98 °F (36.7 °C)] 97.5 °F (36.4 °C)  Heart Rate:  [63-83] 73  Resp:  [16-18] 18  BP: (132-160)/(72-85) 138/72      Physical Exam:  General Exam:  Head:  Normocephalic, atraumatic  HEENT:  Neck supple. Poor dentition.   Fundoscopic Exam:  No signs of disc edema  CVS:  Regular rate and rhythm.  No murmurs  Carotid Examination:  No bruits  Lungs:  Clear to auscultation  Abdomen:  Nontender, Nondistended  Extremities:  No signs of peripheral edema  Skin:  No rashes     Neurologic Exam:     Mental Status:    -Awake, Alert, Oriented X 3  -No word finding difficulties  -No aphasia  -mild dysarthria but patient and SO states this is normal to him  -Follows simple and complex commands     CN II:  Visual fields full.  Pupils equally reactive to light  CN III, IV, VI:  Extraocular Muscles full with no signs of nystagmus  CN V:  Facial sensory is symmetric with no asymmetries.  CN VII:  Facial motor symmetric  CN VIII:  Gross hearing intact bilaterally  CN IX:  Palate elevates symmetrically  CN X:  Palate elevates symmetrically  CN XI:  Shoulder shrug symmetric  CN XII:  Tongue is midline  on protrusion     Motor: (strength out of 5:  1= minimal movement, 2 = movement in plane of gravity, 3 = movement against gravity, 4 = movement against some resistance, 5 = full strength)     -Right Upper Ext: Proximal: 4+           Distal: 4+  -Left Upper Ext: Proximal: 5   Distal: 5     -Right Lower Ext: Proximal: 4-            Distal: 4_  -Left Lower Ext: Proximal: 5   Distal: 5     DTR:  -Right              Biceps: 2+       Triceps: 2+      Brachioradialis: 2+              Patella: 2+       Ankle: 2+         Neg Babinski  -Left              Biceps: 2+       Triceps: 2+      Brachioradialis: 2+              Patella: 2+       Ankle: 2+         Neg Babinski     Sensory:  -Intact to light touch, pinprick, temperature, pain, and proprioception     Coordination:  -Finger to nose  with ataxia on right, left intact  -Heel to shin with ataxia on right, intact on left        Gait  -up with assist  Right foot drop  Ambulated 30 feet with PT.     Pertinent Neuro Exam:    Last nurse assessment:  Interval:  (shift change with Paulina YORK)  1a. Level of Consciousness: 0-->Alert, keenly responsive  1b. LOC Questions: 0-->Answers both questions correctly  1c. LOC Commands: 0-->Performs both tasks correctly  2. Best Gaze: 0-->Normal  3. Visual: 0-->No visual loss  4. Facial Palsy: 0-->Normal symmetrical movements  5a. Motor Arm, Left: 0-->No drift, limb holds 90 (or 45) degrees for full 10 secs  5b. Motor Arm, Right: 0-->No drift, limb holds 90 (or 45) degrees for full 10 secs  6a. Motor Leg, Left: 0-->No drift, leg holds 30 degree position for full 5 secs  6b. Motor Leg, Right: 0-->No drift, leg holds 30 degree position for full 5 secs  7. Limb Ataxia: 0-->Absent  8. Sensory: 1-->Mild-to-moderate sensory loss, patient feels pinprick is less sharp or is dull on the affected side, or there is a loss of superficial pain with pinprick, but patient is aware of being touched  9. Best Language: 0-->No aphasia, normal  10. Dysarthria:  0-->Normal  11. Extinction and Inattention (formerly Neglect): 0-->No abnormality    Total (NIH Stroke Scale): 1       Results Review:      Labs:  Result Review:  I have personally reviewed the results from the time of this admission to 1/17/2024 09:18 CST and agree with these findings:  [x]  Laboratory list / accordion  []  Microbiology  [x]  Radiology  []  EKG/Telemetry   []  Cardiology/Vascular   []  Pathology  []  Old records  []  Other:  Most notable findings include: LDL 95  A1C 5.6  TSH 0.819       Imaging:  CT Angiogram Carotids    Result Date: 1/16/2024  1. Mild LEFT ICA calcified plaque with no flow-limiting stenosis. 2. No arterial thrombus is seen.  This report was signed and finalized on 1/16/2024 5:53 PM by Dr. Heber Saravia MD.      CT Angiogram Head    Result Date: 1/16/2024  1. Intact [place]. 2. No intracranial arterial abnormality is seen.  This report was signed and finalized on 1/16/2024 5:51 PM by Dr. Heber Saravia MD.      MRI Brain Without Contrast    Result Date: 1/16/2024  Impression:  Acute left corona radiata small infarct. No significant mass effect or hemorrhagic conversion.  Additional old left corona radiata and right basal ganglia presumed lacunar infarcts with mild chronic small vessel ischemic changes.  Punctate focus of susceptibility artifact in the right basal ganglia and left thalamus, likely from remote microhemorrhages, possibly hypertensive related.   This report was signed and finalized on 1/16/2024 11:26 AM by Braden Perez.      CT Cervical Spine Without Contrast    Result Date: 1/15/2024  1. No acute bony abnormality.    This report was signed and finalized on 1/15/2024 8:30 PM by Dr. Heber Saravia MD.      CT Head Without Contrast    Result Date: 1/15/2024  1. No acute intracranial abnormality is seen.    This report was signed and finalized on 1/15/2024 8:28 PM by Dr. Heber Saravia MD.          Results for orders placed during the hospital encounter of  01/15/24    Adult Transthoracic Echo Complete W/ Cont if Necessary Per Protocol (With Agitated Saline)    Interpretation Summary    Left ventricular systolic function is normal. Left ventricular ejection fraction appears to be 51 - 55%.    Left ventricular wall thickness is consistent with mild concentric hypertrophy.    Left ventricular diastolic function was normal.    No evidence of a patent foramen ovale. No evidence of an atrial septal defect present. Saline test results are negative for right to left atrial level shunt.    Trace aortic valve regurgitation is present.     Assessment/Plan   Patient 53 y.o. male right handed with no significant medical history and does not follow with PCP. 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 He denies IV drug use. History is obtained by patient and his significant other who is at the bedside.     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 He had difficulty getting up. He did notice right arm mildly weak as well. He and SO denies facial weakness or altered speech. Patient presented to [facility] ED on 1/15/2024 as he was not improving. CT head showed no acute process. CT CS no acute bony abnormality. Patient admitted for further evaluation. MRI brain done this AM shows acute stroke left corona radiata consistent with patient presentation. TTE done shows no PFO or ASD and no structural abnormality other than trace aortic valve regurgitation. He has noted some improvement of right arm and leg since admission. IV TNK not considered as patient presented 3 days after onset of symptoms. Patient hypertensive upon admission at 177/100 and as elevated as 184/102. Patient secondary stroke risk  factors include HTN, HLD, tobacco use, illicit drug use.      Hospital Problem List      Acute stroke of right basal ganglia    Tobacco use    Alcohol use    Methamphetamine use    Hyperlipidemia, LDL 95    Impression:    Acute left corona radiata stroke, likely lacunar.  Hypertension.  Tobacco use  ETOH use  Illicit drug use.  HLD, LDL 95.     Plan:  ASA 81 mg daily.  Lipitor 80 mg for LDL goal less than 70.  SBP goal less than 140.   Counseled to purchase BP cuff and monitor BP as outpatient and keep log for PCP.  Tobacco cessation counseling. Declines Nicoderm patch  Cessation counseling on illicit drug use and risk of recurrent stroke.  Discussed AHA guidelines for ETOH.  Will need f/u neurology in 3-4 weeks.  Will need set up with PCP. Discussed with AYDEE VALADEZ.   Patient not drive 2/2 RLE weakness and no climbing on ladders or step stools and other activities as he is fall risk.   14 day ZIO at discharge.       Medical Decision Making    Number/Complexity of Problems  Moderate  1 undiagnosed new problem with uncertain prognosis -   1 acute illness with systemic symptoms -   High  1 acute or chronic illness that pose a threat to life/body function -   high     MDM Data  Moderate - 1/3 categories  Extensive - 2/3 categories    Category 1: 3 of the following  Review of external notes  Review of results  Ordering of each unique test  Independent historian  Category 2:  Independent interpretation of test (ex: imaging)  Category 3:  Discussion of management with another provider    extensive     Treatment Plan  Moderate - Prescription Drug management  High  Drug therapy requiring intensive monitoring for toxicity  Decision regarding hospitalization or escalation of care  De-escalate care/DNR decisions  high       ALLYSON Maurer  01/17/24  09:10 CST    Electronically signed by Lor Hernandez MD at 01/17/24 0838       Analy Hinkle APRN at 01/16/24 1434       Attestation signed by Malinda  Vaibhav Jean-Baptiste MD at 01/17/24 0818    I have reviewed this documentation and agree.  I have seen the patient in ER 42 at 8:37 AM.  Patient admitted for evaluation of hip and arm pain.  Patient reportedly felt dizzy and fell.  His symptoms of dizziness and weakness on right side been going on couple of nights prior to the presentation in the emergency room making him not a candidate for tPA.  He has discoordination of the right upper extremity.  Diagnostic studies show for available reviewed.  Head CT scan showed no acute intracranial abnormality  It is suspected that patient had stroke.  Workup still pending      Vitals:     01/16/24 0700   BP: 161/91   Pulse: 68   Resp:     Temp:     SpO2: 96%   Patient had positive amphetamine methamphetamine in urine drug screen  A1c 5.6; LDL 95  Patient on aspirin, atorvastatin  MRI pending  Neurology consult pending  Continue present management.  Discussed with nurse Menezes  Discussed with NP Analy.  Full code verified with patient  Linda-roberto is his stated decision maker if he cannot make a decision for himself.                          [facility] Medicine Services  INPATIENT PROGRESS NOTE    Patient Name: Jermaine Howard  Date of Admission: 1/15/2024  Today's Date: 01/16/24  Length of Stay: 1  Primary Care Physician: Provider, No Known    Subjective   Chief Complaint: Right arm, right leg weakness  HPI   Mr. Howard presented to [facility] emergency room 1/15/2024 reporting unable to stand with right arm and right lower extremity weakness.  Patient reported 1/13/2024 he was working on remodeling home and noted right arm and right leg weakness around 2300 while in the shower.  He reported sudden onset of right leg weakness and fell to the floor.  He reported difficulty getting up.  In addition, he noted right arm weakness.  He denied speech difficulty, facial weakness or altered speech.  Reported initially he was having to use his  left hand to move his right arm.  On examination he was noted to have right upper extremity right lower extremity weakness with discoordination of the right upper extremity.  CT head and neck no acute intracranial abnormality.  X-ray pelvis no acute abnormality.  CT cervical spine no acute bony abnormality.  Normal saline fluid bolus, folic acid, aspirin, statin given in ER    Today  Patient seen earlier while remaining in the emergency room due to no beds available on the floor.  Kniffen other in room.  He has difficulty with hand eye coordination right hand.  Right upper extremity and right lower extremity weakness.  No word finding issues.  No facial asymmetry.  No complaints of headache, blurred vision.  Denies nausea, vomiting or abdominal pain.  MRI of the brain shows acute left corona radiata infarct.  Punctate focus of susceptibility artifact right basal ganglia and left thalamus likely remote microhemorrhages.  Echo no evidence of PFO.  No evidence of atrial septal DVT effect.  Saline test negative for right-to-left shunt.  CTA head and carotids pending.      Review of Systems   Constitutional:  Positive for activity change. Negative for fatigue.   HENT:  Negative for congestion and trouble swallowing.    Eyes:  Negative for photophobia and visual disturbance.   Respiratory:  Negative for cough, shortness of breath and wheezing.    Cardiovascular:  Negative for chest pain, palpitations and leg swelling.   Gastrointestinal:  Negative for constipation, diarrhea, nausea and vomiting.   Endocrine: Negative for cold intolerance, heat intolerance and polyuria.   Genitourinary:  Negative for dysuria, frequency and urgency.   Musculoskeletal:  Positive for gait problem (Right lower extremity weakness, right upper extremity weakness).   Skin:  Negative for color change, pallor, rash and wound.   Allergic/Immunologic: Negative for immunocompromised state.   Neurological:  Positive for weakness (Right upper extremity,  right lower extremity, fine motor control right hand.).   Hematological:  Negative for adenopathy. Does not bruise/bleed easily.   Psychiatric/Behavioral:  Negative for agitation, behavioral problems and confusion.       All pertinent negatives and positives are as above. All other systems have been reviewed and are negative unless otherwise stated.     Objective    Temp:  [97.6 °F (36.4 °C)-97.9 °F (36.6 °C)] 97.9 °F (36.6 °C)  Heart Rate:  [55-87] 67  Resp:  [18-21] 18  BP: (132-184)/() 144/79  Physical Exam  Vitals and nursing note reviewed.   HENT:      Head: Normocephalic and atraumatic.      Nose: No congestion.      Mouth/Throat:      Pharynx: Oropharynx is clear. No oropharyngeal exudate or posterior oropharyngeal erythema.   Eyes:      Extraocular Movements: Extraocular movements intact.      Pupils: Pupils are equal, round, and reactive to light.   Cardiovascular:      Rate and Rhythm: Normal rate and regular rhythm.      Heart sounds: No murmur heard.     Comments: Normal sinus rhythm 66 on bedside monitor.  Pulmonary:      Breath sounds: No wheezing or rhonchi.      Comments: No oxygen use.  Abdominal:      Palpations: Abdomen is soft.      Tenderness: There is no abdominal tenderness.   Genitourinary:     Comments: Voiding.  Musculoskeletal:         General: No swelling or tenderness.      Cervical back: Normal range of motion and neck supple.   Skin:     General: Skin is warm and dry.   Neurological:      Mental Status: He is alert.   Psychiatric:         Mood and Affect: Mood normal.         Behavior: Behavior normal.         Thought Content: Thought content normal.         Judgment: Judgment normal.       Results Review:  I have reviewed the labs, radiology results, and diagnostic studies.    Laboratory Data:   Results from last 7 days   Lab Units 01/15/24  2018   WBC 10*3/mm3 6.65   HEMOGLOBIN g/dL 16.7   HEMATOCRIT % 50.7   PLATELETS 10*3/mm3 228        Results from last 7 days   Lab Units  "01/15/24  2018   SODIUM mmol/L 142   POTASSIUM mmol/L 3.5   CHLORIDE mmol/L 103   CO2 mmol/L 30.0*   BUN mg/dL 9   CREATININE mg/dL 0.72*   CALCIUM mg/dL 9.6   BILIRUBIN mg/dL 0.4   ALK PHOS U/L 88   ALT (SGPT) U/L 15   AST (SGOT) U/L 20   GLUCOSE mg/dL 84       Culture Data:   No results found for: \"BLOODCX\", \"URINECX\", \"WOUNDCX\", \"MRSACX\", \"RESPCX\", \"STOOLCX\"    Radiology Data:   Imaging Results (Last 24 Hours)       Procedure Component Value Units Date/Time    MRI Brain Without Contrast [[id number]] Collected: 01/16/24 1116     Updated: 01/16/24 1129    Narrative:      Exam: MRI BRAIN WO CONTRAST- 1/16/2024 9:40 AM     History: CVA; R53.1-Weakness     Technique: Multisequence, multiplanar MRI of the brain was performed  without intravenous contrast.      Contrast: None.     Comparison: CT head 1/15/2024     Findings:      Acute infarct involving the left corona radiata. Minimal associated  vasogenic edema without significant mass effect or hemorrhagic  conversion.     Small left centrum semiovale old lacunar infarct. Presumed remote right  basal ganglia lacunar infarct. Mild presumed superimposed chronic small  vessel ischemic changes. Small focus of susceptibility artifact  involving the right basal ganglia and left thalamus.     Ventricles and extra-axial CSF spaces are normal in size. Major vascular  flow voids are preserved.     Sella/parasellar structures are grossly unremarkable. No tonsillar  ectopia.     Orbits are grossly unremarkable. Minimal right maxillary sinus mucosal  thickening. Mastoid air cells are grossly clear.     No suspicious calvarial or extracranial soft tissue abnormality.       Impression:      Impression:     Acute left corona radiata small infarct. No significant mass effect or  hemorrhagic conversion.     Additional old left corona radiata and right basal ganglia presumed  lacunar infarcts with mild chronic small vessel ischemic changes.     Punctate focus of susceptibility artifact " in the right basal ganglia and  left thalamus, likely from remote microhemorrhages, possibly  hypertensive related.        This report was signed and finalized on 1/16/2024 11:26 AM by Braden Perez.       CT Cervical Spine Without Contrast [[id number]] Collected: 01/15/24 2029     Updated: 01/15/24 2033    Narrative:      EXAMINATION: CT CERVICAL SPINE WO CONTRAST-      1/15/2024 7:13 PM     HISTORY: Fall injury. Neck pain.     In order to have a CT radiation dose as low as reasonably achievable  Automated Exposure Control was utilized for adjustment of the mA and/or  KV according to patient size.     CT Dose DLP = 1160.14 mGy.cm.  (If there are multiple studies performed at the same time this  represents the total dose).     Axial, sagittal, and coronal noncontrast CT imaging.     Vertebral bodies and posterior elements are intact.     Reconstructed sagittal images shows straightening of the normal lordotic  curvature.   There is no malalignment. Prevertebral soft tissues are normal.   Facet joints align normally.     Reconstructed coronal images show normal lateral mass alignment.       Impression:      1. No acute bony abnormality.           This report was signed and finalized on 1/15/2024 8:30 PM by Dr. Heber Saravia MD.       XR Hip With or Without Pelvis 2 - 3 View Right [[id number]] Collected: 01/15/24 2028     Updated: 01/15/24 2032    Narrative:      EXAMINATION: XR HIP W OR WO PELVIS 2-3 VIEW RIGHT-     1/15/2024 7:24 PM     HISTORY: Fall injury. RIGHT hip pain     COMPARISON:  None.     Pelvis and RIGHT hip, 3 views.     The pelvic ring is intact.  No pubic symphysis or sacroiliac joint diastasis.     The proximal RIGHT femur and acetabulum are intact.     Normal appearance of the hip joint space.     Soft tissues are appropriate.     Summary:     1. No acute bony abnormality is seen.           This report was signed and finalized on 1/15/2024 8:29 PM by Dr. Heber Saravia MD.       CT Head Without  Contrast [[id number]] Collected: 01/15/24 2027     Updated: 01/15/24 2031    Narrative:      EXAMINATION: CT HEAD WO CONTRAST-      1/15/2024 7:13 PM     HISTORY: Fall injury. RIGHT side weakness.     In order to have a CT radiation dose as low as reasonably achievable  Automated Exposure Control was utilized for adjustment of the mA and/or  KV according to patient size.     CT Dose DLP = 1160.14 mGy.cm.  (If there are multiple studies performed at the same time this  represents the total dose).     Axial, sagittal, and coronal noncontrast CT imaging of the head.     The visualized paranasal sinuses are clear.     The brain and ventricles have an age appropriate appearance.   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  There is no hemorrhage or mass-effect.   No acute infarction is seen.     No calvarial abnormality.       Impression:      1. No acute intracranial abnormality is seen.           This report was signed and finalized on 1/15/2024 8:28 PM by Dr. Heber Saravia MD.             Scheduled medications:  aspirin, 325 mg, Oral, Daily   Or  aspirin, 300 mg, Rectal, Daily  atorvastatin, 80 mg, Oral, Nightly  folic acid 1 mg in sodium chloride 0.9 % 50 mL IVPB, 1 mg, Intravenous, Daily  losartan, 25 mg, Oral, Q24H  sodium chloride, 10 mL, Intravenous, Q12H  thiamine (B-1) 100 mg in sodium chloride 0.9 % 100 mL IVPB, 100 mg, Intravenous, Daily       I have reviewed the patient's current medications.     Assessment/Plan   Assessment  Active Hospital Problems    Diagnosis     **Acute stroke of right basal ganglia     Tobacco use     Alcohol use     Methamphetamine use     Hyperlipidemia, LDL 95        Treatment Plan    1.  Acute right basal ganglia stroke.  Presented with right arm, right leg weakness.  MRI right basal ganglia stroke.  Echo EF 51-55%.  No PFO.  No evidence of atrial septal defect.  Saline test negative for right-to-left  atrial shunt.  CTA head and carotids pending.  Neurology consulted.  Discussed with ALLYSON Brown today.  Aspirin 325 mg orally daily.  Lipitor 80 mg nightly.  SBP goal less than 140 as patient is 3 to 4 days from symptoms.    Physical therapy, Occupational Therapy, speech therapy consulted.  Patient at baseline with communication and cognition.  No ST services needed.    2.  Hyperlipidemia.  LDL 95 Lipitor 80 mg orally nightly.  Hemoglobin A1c 5.6    3.  Hypertension.  Blood pressure 172/103 on admission.  Trended down to 144/79.  Per neurology SBP goal less than 140 now that 3 to 4 days from symptom onset.  Start losartan 25 mg orally daily    4.  Tobacco use.  Discussed smoking cessation    5.  Methamphetamine use.  Discussed methamphetamine discontinuation.    6.  Alcohol use.  Patient admits to 2 drinks whiskey daily.  No signs of shaking or withdrawal.  Prophylactic folic acid and thiamine.    7.  SCDs for deep vein thrombosis prophylaxis    8.  Patient has no primary care provider.  Will need to establish care with local MD at discharge.  Follow-up with neurology 4 weeks.      Medical Decision Making  Number and Complexity of problems: 5  Acute right basal ganglia stroke: Acute, high complexity poses threat to life and bodily function  Hyperlipidemia: Chronic, moderate complexity, not at baseline  Tobacco use: Chronic, moderate complexity  Methamphetamine use: Chronic, moderate complexity  Alcohol use: Chronic, moderate/high complexity.    Differential Diagnosis: None    Conditions and Status        Condition is improving.     [facility] Data  External documents reviewed: None available  Cardiac tracing (EKG, telemetry) interpretation: Sinus rhythm 66  Radiology interpretation: Reviewed radiology interpretation MRI of the brain, CT scan of the head, x-ray hips, CT cervical spine  Labs reviewed:   CMP 1/15/2024.  Repeat CMP in AM.  CBC 1/15/2024.  Repeat CBC in AM.  Hemoglobin A1c, lipid panel  Urine drug  screen    Any tests that were considered but not ordered: None     Decision rules/scores evaluated (example [id number], Wells, etc): None     Discussed with: Mary Quiroz APRN neurology, Dr. Strong neurology, patient, and significant other present at bedside.     Care Planning  Shared decision making: Mary Quiroz APRN neurology, Dr. Strong neurology, patient, and significant other present at bedside.  Patient agrees to neurology consult, CTA carotids, head.  Increase to aspirin, statin, discussed smoking cessation, alcohol cessation.  Code status and discussions: Full code  The patient surrogate decision maker is Linda Davis  Social Determinants of Health that impact treatment or disposition: None  I expect the patient to be discharged to home in 1-2 days.     Electronically signed by ALLYSON Fregoso, 01/16/24, 16:45 CST.     The above documentation resulted from a face-to-face encounter by me Analy VALADEZ, [facility].      Electronically signed by Vaibhav Petty MD at 01/17/24 0818          Consult Notes (last 72 hours)        Tari Lama APRN at 01/16/24 1312        Consult Orders    1. Inpatient Neurology Consult Stroke [[id number]] ordered by Manisha Painting DO at 01/15/24 2133              Attestation with edits by Sukumar Marino MD at 01/16/24 1917 (Updated)    Patient seen and evaluated with advanced practitioner.    I have reviewed this documentation and agree.    53 M active smoker 1 PPD, EtOH use disorder, methamphetamine use disorder presenting with right arm and leg weakness since 3 days PTA.  MRI brain with acute stroke in left corona radiata and chronic left corona radiata and right basal nuclei strokes.  Etiology most likely for this lacunar strokes due to small vessel disease. However given the size of the lacune (potentially medium vessel) he would benefit from Zio patch at discharge as there is a small  possibility this could have been embolic.    Etiology: Small vessel disease versus less likely embolic  Plan: Aspirin 325 daily, atorvastatin 80 daily, PT/OT/SLP evaluation, use disorder counseling, Zio patch at discharge    BP goal: SBP < 140 would consider starting chronic anti-HTN therapy if not consistently at goal.  Labs: A1c: 5.6, LDL: 95, UDS positive for methamphetamines and amphetamines  TTE: LVEF: 51 to 55%, no atrial shunt, no significant valvulopathy, normal size LA  MRI Brain: Acute stroke in left frontal radiata and chronic left corona radiata and right basal nuclei strokes.  CTA head and neck: Mild intracranial atherobilateral, mild left ICA calcified atheroma, no LVO, no significant stenosis  Dispo: Pending  SLP: Regular diet      Rest of plan as below.    Sukumar Marino MD                    Neurology Consult Note    Consult Date: 2024  Referring MD: No ref. provider found  Reason for Consult: stroke     Patient: Jermaine Howard (53 y.o. male)  MRN: [id number]  : 1970    History of Present Illness:   Jermaine Howard is a 53 y.o. male right handed with no significant medical history and does not follow with PCP. He does admit to 1ppd tobacco use for 20 years, [facility], and methamphetamine use but had gone to rehab and uses meth periodically. He denies IV drug use. History is obtained by patient and his significant other who is at the bedside. Patient seen along with Dr. LETICIA Gomze.     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 He had difficulty getting up. He did notice right arm mildly weak as well. He and SO denies facial weakness or altered speech. Patient presented to [facility] ED on 1/15/2024 as he was not improving. CT head showed no acute process. CT CS no acute bony abnormality.  Patient admitted for further evaluation. MRI brain done this AM shows acute stroke left corona radiata consistent with patient presentation. TTE done shows no PFO or ASD and no structural abnormality other than trace aortic valve regurgitation. He has noted some improvement of right arm and leg since admission. IV TNK not considered as patient presented 3 days after onset of symptoms.       Medical History:   Past Medical/Surgical Hx:  History reviewed. No pertinent past medical history.  History reviewed. No pertinent surgical history.    Medications On Admission:  (Not in a hospital admission)      Current Medications:    Current Facility-Administered Medications:     acetaminophen (TYLENOL) tablet 650 mg, 650 mg, Oral, Q6H PRN, Manisha Painting DO    aspirin tablet 325 mg, 325 mg, Oral, Daily, 325 mg at 01/16/24 0917 **OR** aspirin suppository 300 mg, 300 mg, Rectal, Daily, Manisha Painting DO    atorvastatin (LIPITOR) tablet 80 mg, 80 mg, Oral, Nightly, Manisha Painting DO, 80 mg at 01/16/24 0021    folic acid 1 mg in sodium chloride 0.9 % 50 mL IVPB, 1 mg, Intravenous, Daily, Helphenstine, Aram S, DO, Last Rate: 100 mL/hr at 01/16/24 0917, 1 mg at 01/16/24 0917    ondansetron (ZOFRAN) injection 4 mg, 4 mg, Intravenous, Q6H PRN, Manisha Painting DO    sodium chloride 0.9 % flush 10 mL, 10 mL, Intravenous, Q12H, Manisha Painting DO, 10 mL at 01/16/24 0917    sodium chloride 0.9 % flush 10 mL, 10 mL, Intravenous, PRN, Manisha Painting DO    sodium chloride 0.9 % infusion 40 mL, 40 mL, Intravenous, PRN, Manisha Painting DO    thiamine (B-1) 100 mg in sodium chloride 0.9 % 100 mL IVPB, 100 mg, Intravenous, Daily, Helphenstine, Aram S, DO, Last Rate: 200 mL/hr at 01/16/24 0917, 100 mg at 01/16/24 0917  No current outpatient medications on file.     Allergies:  No Known Allergies    Social Hx:  Social History     Socioeconomic History    Marital status:    Tobacco Use    Smoking status:  Every Day     Packs/day: 1     Types: Cigarettes   Substance and Sexual Activity    Alcohol use: Yes     Comment: Whiskey -- everyday    Drug use: Not Currently     Types: Methamphetamines     Comment: 1 WEEK AGO       Family Hx:  History reviewed. No pertinent family history.  Physical Examination:   Vital Signs:  Vitals:    01/16/24 0401 01/16/24 0501 01/16/24 0700 01/16/24 0900   BP: 174/100 171/87 161/91 134/86   Pulse: 69 58 68 66   Resp:       Temp:       TempSrc:       SpO2: 95% 97% 96% 95%   Weight:       Height:           General Exam:  Head:  Normocephalic, atraumatic  HEENT:  Neck supple. Poor dentition.   Fundoscopic Exam:  No signs of disc edema  CVS:  Regular rate and rhythm.  No murmurs  Carotid Examination:  No bruits  Lungs:  Clear to auscultation  Abdomen:  Nontender, Nondistended  Extremities:  No signs of peripheral edema  Skin:  No rashes    Neurologic Exam:    Mental Status:    -Awake, Alert, Oriented X 3  -No word finding difficulties  -No aphasia  -mild dysarthria but patient and SO states this is normal to him  -Follows simple and complex commands    CN II:  Visual fields full.  Pupils equally reactive to light  CN III, IV, VI:  Extraocular Muscles full with no signs of nystagmus  CN V:  Facial sensory is symmetric with no asymmetries.  CN VII:  Facial motor symmetric  CN VIII:  Gross hearing intact bilaterally  CN IX:  Palate elevates symmetrically  CN X:  Palate elevates symmetrically  CN XI:  Shoulder shrug symmetric  CN XII:  Tongue is midline on protrusion    Motor: (strength out of 5:  1= minimal movement, 2 = movement in plane of gravity, 3 = movement against gravity, 4 = movement against some resistance, 5 = full strength)    -Right Upper Ext: Proximal: 4+ Distal: 4+  -Left Upper Ext: Proximal: 5 Distal: 5    -Right Lower Ext: Proximal: 4- Distal: 4_  -Left Lower Ext: Proximal: 5 Distal: 5    DTR:  -Right   Biceps: 2+ Triceps: 2+ Brachioradialis: 2+   Patella: 2+ Ankle: 2+ Neg  Babinski  -Left   Biceps: 2+ Triceps: 2+ Brachioradialis: 2+   Patella: 2+ Ankle: 2+ Neg Babinski    Sensory:  -Intact to light touch, pinprick, temperature, pain, and proprioception    Coordination:  -Finger to nose  with ataxia on right, left intact  -Heel to shin with ataxia on right, intact on left      Gait  -not tested for safety reasons.       Recent Diagnostics:   Laboratory Results:   - Reviewed in EMR  Lab Results   Component Value Date    GLUCOSE 84 01/15/2024    CALCIUM 9.6 01/15/2024     01/15/2024    K 3.5 01/15/2024    CO2 30.0 (H) 01/15/2024     01/15/2024    BUN 9 01/15/2024    CREATININE 0.72 (L) 01/15/2024    BCR 12.5 01/15/2024    ANIONGAP 9.0 01/15/2024     Lab Results   Component Value Date    WBC 6.65 01/15/2024    HGB 16.7 01/15/2024    HCT 50.7 01/15/2024    MCV 91.2 01/15/2024     01/15/2024     Lab Results   Component Value Date    PTT 29.2 01/15/2024    INR 0.92 01/15/2024     Lab Results   Component Value Date    TRIG 143 01/15/2024    HDL 40 01/15/2024    LDL 95 01/15/2024     Lab Results   Component Value Date    HGBA1C 5.60 01/16/2024       Imaging Results:  Imaging Results (Last 24 Hours)       Procedure Component Value Units Date/Time    MRI Brain Without Contrast [[id number]] Collected: 01/16/24 1116     Updated: 01/16/24 1129    Narrative:      Exam: MRI BRAIN WO CONTRAST- 1/16/2024 9:40 AM     History: CVA; R53.1-Weakness     Technique: Multisequence, multiplanar MRI of the brain was performed  without intravenous contrast.      Contrast: None.     Comparison: CT head 1/15/2024     Findings:      Acute infarct involving the left corona radiata. Minimal associated  vasogenic edema without significant mass effect or hemorrhagic  conversion.     Small left centrum semiovale old lacunar infarct. Presumed remote right  basal ganglia lacunar infarct. Mild presumed superimposed chronic small  vessel ischemic changes. Small focus of susceptibility artifact  involving  the right basal ganglia and left thalamus.     Ventricles and extra-axial CSF spaces are normal in size. Major vascular  flow voids are preserved.     Sella/parasellar structures are grossly unremarkable. No tonsillar  ectopia.     Orbits are grossly unremarkable. Minimal right maxillary sinus mucosal  thickening. Mastoid air cells are grossly clear.     No suspicious calvarial or extracranial soft tissue abnormality.       Impression:      Impression:     Acute left corona radiata small infarct. No significant mass effect or  hemorrhagic conversion.     Additional old left corona radiata and right basal ganglia presumed  lacunar infarcts with mild chronic small vessel ischemic changes.     Punctate focus of susceptibility artifact in the right basal ganglia and  left thalamus, likely from remote microhemorrhages, possibly  hypertensive related.        This report was signed and finalized on 1/16/2024 11:26 AM by Braden Perez.       CT Cervical Spine Without Contrast [[id number]] Collected: 01/15/24 2029     Updated: 01/15/24 2033    Narrative:      EXAMINATION: CT CERVICAL SPINE WO CONTRAST-      1/15/2024 7:13 PM     HISTORY: Fall injury. Neck pain.     In order to have a CT radiation dose as low as reasonably achievable  Automated Exposure Control was utilized for adjustment of the mA and/or  KV according to patient size.     CT Dose DLP = 1160.14 mGy.cm.  (If there are multiple studies performed at the same time this  represents the total dose).     Axial, sagittal, and coronal noncontrast CT imaging.     Vertebral bodies and posterior elements are intact.     Reconstructed sagittal images shows straightening of the normal lordotic  curvature.   There is no malalignment. Prevertebral soft tissues are normal.   Facet joints align normally.     Reconstructed coronal images show normal lateral mass alignment.       Impression:      1. No acute bony abnormality.           This report was signed and finalized on  1/15/2024 8:30 PM by Dr. Heber Saravia MD.       XR Hip With or Without Pelvis 2 - 3 View Right [[id number]] Collected: 01/15/24 2028     Updated: 01/15/24 2032    Narrative:      EXAMINATION: XR HIP W OR WO PELVIS 2-3 VIEW RIGHT-     1/15/2024 7:24 PM     HISTORY: Fall injury. RIGHT hip pain     COMPARISON:  None.     Pelvis and RIGHT hip, 3 views.     The pelvic ring is intact.  No pubic symphysis or sacroiliac joint diastasis.     The proximal RIGHT femur and acetabulum are intact.     Normal appearance of the hip joint space.     Soft tissues are appropriate.     Summary:     1. No acute bony abnormality is seen.           This report was signed and finalized on 1/15/2024 8:29 PM by Dr. Heber Saravia MD.       CT Head Without Contrast [[id number]] Collected: 01/15/24 2027     Updated: 01/15/24 2031    Narrative:      EXAMINATION: CT HEAD WO CONTRAST-      1/15/2024 7:13 PM     HISTORY: Fall injury. RIGHT side weakness.     In order to have a CT radiation dose as low as reasonably achievable  Automated Exposure Control was utilized for adjustment of the mA and/or  KV according to patient size.     CT Dose DLP = 1160.14 mGy.cm.  (If there are multiple studies performed at the same time this  represents the total dose).     Axial, sagittal, and coronal noncontrast CT imaging of the head.     The visualized paranasal sinuses are clear.     The brain and ventricles have an age appropriate appearance.   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  There is no hemorrhage or mass-effect.   No acute infarction is seen.     No calvarial abnormality.       Impression:      1. No acute intracranial abnormality is seen.           This report was signed and finalized on 1/15/2024 8:28 PM by Dr. Heber Saravia MD.              MRI brain personally reviewed by me showing acute left coronoa radiata, likely lacunar.     Other labs:  LDL 95  A1C 5.6  TSH  0.819  UDS + methamphetamine and amphetamines      Assessment & Plan:   Patient 53 y.o. male right handed with no significant medical history and does not follow with PCP. He does admit to 1ppd tobacco use for 20 years, 20 years of EOTH use of mixed drinks, and methamphetamine use but had gone to rehab and uses meth periodically. He denies IV drug use. History is obtained by patient and his significant other who is at the bedside.    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 He had difficulty getting up. He did notice right arm mildly weak as well. He and SO denies facial weakness or altered speech. Patient presented to [facility] ED on 1/15/2024 as he was not improving. CT head showed no acute process. CT CS no acute bony abnormality. Patient admitted for further evaluation. MRI brain done this AM shows acute stroke left corona radiata consistent with patient presentation. TTE done shows no PFO or ASD and no structural abnormality other than trace aortic valve regurgitation. He has noted some improvement of right arm and leg since admission. IV TNK not considered as patient presented 3 days after onset of symptoms. Patient hypertensive upon admission at 177/100 and as elevated as 184/102. Patient secondary stroke risk factors include HTN, HLD, tobacco use, illicit drug use.       Impression:  Acute left corona radiata stroke, likely lacunar.  Hypertension.  Tobacco use  ETOH use  Illicit drug use.  HLD, LDL 95.       Plan:    mg daily.  Lipitor 80 mg daily for LDL goal less than 70.  SBP goal less than 140 as he is now 3-4 days from symptom onset.  OT/PT/ST  CTA head and neck.  Tobacco and illicit drug use cessation counseling. ETOH cessation counseling.   SCD for DVT ppx.      Tari Lama, APRN  01/16/24  13:13 CST    Medical  Decision Making    Number/Complexity of Problems  Moderate  1 undiagnosed new problem with uncertain prognosis -   1 acute illness with systemic symptoms -   High  1 acute or chronic illness that poses a threat to life/body function -   high     MDM Data  Moderate - 1/3 categories  Extensive - 2/3 categories    Category 1: 3 of the following  Review of external notes  Review of results  Ordering of each unique test  Independent historian  Category 2:  Independent interpretation of test (ex: imaging)  Category 3:  Discussion of management with another provider    extensive     Treatment Plan  Moderate - Prescription Drug management  High  Drug therapy requiring intensive monitoring for toxicity  Decision regarding hospitalization or escalation of care  De-escalate care/DNR decisions  high        Electronically signed by Sukumar Marino MD at 01/16/24 1917          Discharge Summary        Analy Hinkle APRN at 01/17/24 0846                [facility] Medicine Services  DISCHARGE SUMMARY     Date of Admission: 1/15/2024  Date of Discharge:  1/17/2024  Primary Care Physician: Provider, No Known    Presenting Problem/History of Present Illness:  Right arm, right leg weakness    Final Discharge Diagnoses:  Active Hospital Problems    Diagnosis     **Acute stroke of right basal ganglia     Primary hypertension     Tobacco use     Alcohol use     Methamphetamine use     Hyperlipidemia, LDL 95        Consults: Dr. Tae Marino, neurology    Procedures Performed: None    Pertinent Test Results:   Results for orders placed during the hospital encounter of 01/15/24    Adult Transthoracic Echo Complete W/ Cont if Necessary Per Protocol (With Agitated Saline)    Interpretation Summary    Left ventricular systolic function is normal. Left ventricular ejection fraction appears to be 51 - 55%.    Left ventricular wall thickness is consistent with mild concentric hypertrophy.    Left  ventricular diastolic function was normal.    No evidence of a patent foramen ovale. No evidence of an atrial septal defect present. Saline test results are negative for right to left atrial level shunt.    Trace aortic valve regurgitation is present.      Imaging Results (All)       Procedure Component Value Units Date/Time    CT Angiogram Carotids [[id number]] Collected: 01/16/24 1751     Updated: 01/16/24 1756    Narrative:      EXAMINATION: CT ANGIOGRAM CAROTIDS-      1/16/2024 4:01 PM     HISTORY: CVA; R53.1-Weakness; R41.89-Other symptoms and signs involving  cognitive functions and awareness; R46.89-Other symptoms and signs  involving appearance and behavior     In order to have a CT radiation dose as low as reasonably achievable  Automated Exposure Control was utilized for adjustment of the mA and/or  KV according to patient size.     CT Dose DLP = 381 mGy.cm.  (If there are multiple studies performed at the same time this  represents the total dose).     CT angiogram carotid arteries.  CT angiography protocol.  CT imaging with bolus IV contrast injection.  Under concurrent supervision axial, sagittal, coronal, and MIP data sets  were constructed on an independent work station.     Mild aortic arch calcification.  Normally patent great vessel origins.     Normal and symmetric common carotid arteries.     Normally patent carotid bifurcations.     There is mild calcified plaque within the proximal LEFT ICA.  No flow limiting stenosis.     Both of the vertebral arteries are patent.  The LEFT vertebral artery is dominant.       Impression:      1. Mild LEFT ICA calcified plaque with no flow-limiting stenosis.  2. No arterial thrombus is seen.     This report was signed and finalized on 1/16/2024 5:53 PM by Dr. Heber Saravia MD.       CT Angiogram Head [[id number]] Collected: 01/16/24 1750     Updated: 01/16/24 1754    Narrative:      EXAMINATION: CT ANGIOGRAM HEAD-      1/16/2024 4:01 PM     HISTORY: stroke;  R53.1-Weakness; R41.89-Other symptoms and signs  involving cognitive functions and awareness; R46.89-Other symptoms and  signs involving appearance and behavior     In order to have a CT radiation dose as low as reasonably achievable  Automated Exposure Control was utilized for adjustment of the mA and/or  KV according to patient size.     CT Dose DLP = 381 mGy.cm.  (If there are multiple studies performed at the same time this  represents the total dose).     CT angiogram head with IV contrast.  CT angiography protocol.  CT imaging with bolus IV contrast injection.  Under concurrent supervision axial, sagittal, coronal, and MIP data sets  were constructed on an independent work station.     The distal LEFT vertebral artery is dominant.  Both of the distal vertebral arteries are patent.  The basilar artery is normal.     Normal and symmetric anterior, middle, and posterior cerebral arteries.     No high-grade stenosis, arterial thrombus, or arterial occlusion is  seen.  No aneurysm is seen.       Impression:      1. Intact [place].  2. No intracranial arterial abnormality is seen.     This report was signed and finalized on 1/16/2024 5:51 PM by Dr. Heber Saravia MD.       MRI Brain Without Contrast [[id number]] Collected: 01/16/24 1116     Updated: 01/16/24 1129    Narrative:      Exam: MRI BRAIN WO CONTRAST- 1/16/2024 9:40 AM     History: CVA; R53.1-Weakness     Technique: Multisequence, multiplanar MRI of the brain was performed  without intravenous contrast.      Contrast: None.     Comparison: CT head 1/15/2024     Findings:      Acute infarct involving the left corona radiata. Minimal associated  vasogenic edema without significant mass effect or hemorrhagic  conversion.     Small left centrum semiovale old lacunar infarct. Presumed remote right  basal ganglia lacunar infarct. Mild presumed superimposed chronic small  vessel ischemic changes. Small focus of susceptibility artifact  involving the right  basal ganglia and left thalamus.     Ventricles and extra-axial CSF spaces are normal in size. Major vascular  flow voids are preserved.     Sella/parasellar structures are grossly unremarkable. No tonsillar  ectopia.     Orbits are grossly unremarkable. Minimal right maxillary sinus mucosal  thickening. Mastoid air cells are grossly clear.     No suspicious calvarial or extracranial soft tissue abnormality.       Impression:      Impression:     Acute left corona radiata small infarct. No significant mass effect or  hemorrhagic conversion.     Additional old left corona radiata and right basal ganglia presumed  lacunar infarcts with mild chronic small vessel ischemic changes.     Punctate focus of susceptibility artifact in the right basal ganglia and  left thalamus, likely from remote microhemorrhages, possibly  hypertensive related.        This report was signed and finalized on 1/16/2024 11:26 AM by Braden Perez.       CT Cervical Spine Without Contrast [[id number]] Collected: 01/15/24 2029     Updated: 01/15/24 2033    Narrative:      EXAMINATION: CT CERVICAL SPINE WO CONTRAST-      1/15/2024 7:13 PM     HISTORY: Fall injury. Neck pain.     In order to have a CT radiation dose as low as reasonably achievable  Automated Exposure Control was utilized for adjustment of the mA and/or  KV according to patient size.     CT Dose DLP = 1160.14 mGy.cm.  (If there are multiple studies performed at the same time this  represents the total dose).     Axial, sagittal, and coronal noncontrast CT imaging.     Vertebral bodies and posterior elements are intact.     Reconstructed sagittal images shows straightening of the normal lordotic  curvature.   There is no malalignment. Prevertebral soft tissues are normal.   Facet joints align normally.     Reconstructed coronal images show normal lateral mass alignment.       Impression:      1. No acute bony abnormality.           This report was signed and finalized on 1/15/2024  8:30 PM by Dr. Heber Saravia MD.       XR Hip With or Without Pelvis 2 - 3 View Right [[id number]] Collected: 01/15/24 2028     Updated: 01/15/24 2032    Narrative:      EXAMINATION: XR HIP W OR WO PELVIS 2-3 VIEW RIGHT-     1/15/2024 7:24 PM     HISTORY: Fall injury. RIGHT hip pain     COMPARISON:  None.     Pelvis and RIGHT hip, 3 views.     The pelvic ring is intact.  No pubic symphysis or sacroiliac joint diastasis.     The proximal RIGHT femur and acetabulum are intact.     Normal appearance of the hip joint space.     Soft tissues are appropriate.     Summary:     1. No acute bony abnormality is seen.           This report was signed and finalized on 1/15/2024 8:29 PM by Dr. Heber Saravia MD.       CT Head Without Contrast [[id number]] Collected: 01/15/24 2027     Updated: 01/15/24 2031    Narrative:      EXAMINATION: CT HEAD WO CONTRAST-      1/15/2024 7:13 PM     HISTORY: Fall injury. RIGHT side weakness.     In order to have a CT radiation dose as low as reasonably achievable  Automated Exposure Control was utilized for adjustment of the mA and/or  KV according to patient size.     CT Dose DLP = 1160.14 mGy.cm.  (If there are multiple studies performed at the same time this  represents the total dose).     Axial, sagittal, and coronal noncontrast CT imaging of the head.     The visualized paranasal sinuses are clear.     The brain and ventricles have an age appropriate appearance.   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  There is no hemorrhage or mass-effect.   No acute infarction is seen.     No calvarial abnormality.       Impression:      1. No acute intracranial abnormality is seen.           This report was signed and finalized on 1/15/2024 8:28 PM by Dr. Heber Saravia MD.             LAB RESULTS:      Lab 01/17/24  0526 01/15/24  2018   WBC 6.47 6.65   HEMOGLOBIN 15.5 16.7   HEMATOCRIT 45.6 50.7   PLATELETS 214 228    NEUTROS ABS  --  3.98   IMMATURE GRANS (ABS)  --  0.01   LYMPHS ABS  --  1.93   MONOS ABS  --  0.56   EOS ABS  --  0.12   MCV 89.2 91.2   PROTIME  --  12.5   APTT  --  29.2         Lab 01/17/24  0526 01/16/24  0530 01/15/24  2018   SODIUM 141  --  142   POTASSIUM 3.6  --  3.5   CHLORIDE 107  --  103   CO2 24.0  --  30.0*   ANION GAP 10.0  --  9.0   BUN 9  --  9   CREATININE 0.81  --  0.72*   EGFR 105.4  --  109.2   GLUCOSE 101*  --  84   CALCIUM 8.6  --  9.6   MAGNESIUM  --   --  2.0   HEMOGLOBIN A1C  --  5.60  --    TSH  --   --  0.819         Lab 01/17/24  0526 01/15/24  2018   TOTAL PROTEIN 6.3 7.5   ALBUMIN 3.8 4.4   GLOBULIN 2.5 3.1   ALT (SGPT) 11 15   AST (SGOT) 18 20   BILIRUBIN 0.4 0.4   ALK PHOS 72 88   LIPASE  --  29         Lab 01/15/24  2018   HSTROP T 9   PROTIME 12.5   INR 0.92         Lab 01/15/24  2018   CHOLESTEROL 160   LDL CHOL 95   HDL CHOL 40   TRIGLYCERIDES 143         Lab 01/15/24  2122   VITAMIN B 12 507         Brief Urine Lab Results  (Last result in the past 365 days)        Color   Clarity   Blood   Leuk Est   Nitrite   Protein   CREAT   Urine HCG        01/15/24 2212 Yellow   Clear   Negative   Negative   Negative   Negative                 Microbiology Results (last 10 days)       ** No results found for the last 240 hours. **            Hospital Course: Mr. Howard presented to [facility] emergency room 1/15/2024 reporting unable to stand with right arm and right lower extremity weakness.  Patient reported 1/13/2024 he was working on remodeling home and noted right arm and right leg weakness around 2300 while in the shower.  He reported sudden onset of right leg weakness and fell to the floor.  He reported difficulty getting up.  In addition, he noted right arm weakness.  He denied speech difficulty, facial weakness or altered speech.  Reported initially he was having to use his left hand to move his right arm.  On examination he was noted to have right upper extremity  right lower extremity weakness with discoordination of the right upper extremity.  CT head and neck no acute intracranial abnormality.  X-ray pelvis no acute abnormality.  CT cervical spine no acute bony abnormality.  Normal saline fluid bolus, folic acid, aspirin, statin given in ER.    He was admitted to the neurology floor with acute right basal ganglia stroke.  He presented with right arm, right leg weakness.  MRI positive for right basal ganglia stroke.  Echo showed EF 51-55% with no PFO.  No evidence of atrial septal defect.  Saline test negative for right-to-left shunt.  CTA carotids noted mild left ICA calcified plaque with no flow-limiting stenosis.  No arterial thrombus.  CTA head noted intact [place].  No intracranial arterial abnormality seen.  Aspirin, statin started on admission.    Neurology consulted and he was seen by Dr. Tae Marino with impression acute right basal ganglia stroke likely lacunar stroke with small vessel disease.  Continue aspirin, atorvastatin.  Zio patch at discharge.  SBP goal less than 140.  Urine drug screen positive for methamphetamines and amphetamines.    Physical therapy, Occupational Therapy, speech therapy consulted.  Physical therapy noted right upper extremity, right lower extremity weakness.  Fatigues quickly with ambulation due to right toe drag due to right foot drop.  Patient able to compensate for 30 feet.  He used rolling walker to ambulate safely.  Patient educated on proper gait mechanics to progress his ambulation safely and coordination.  Patient would benefit from continued physical therapy to work as an outpatient but patient has no source of income for further therapy after discharge.    LDL 95 with LDL goal less than 70.  Lipitor 80 mg orally nightly ordered.  Hemoglobin A1c 5.6.    Patient presented with hypertension.  Blood pressure 172/100.  Blood pressure trended down to 144/79.  Per neurology, systolic blood pressure goal less than 140 now  "that patient 3 to 4 days from onset of symptoms.  Losartan 25 mg orally daily started.  Blood pressure 138/72 at discharge.  Patient has been advised to purchase blood pressure cuff and monitor blood pressure to take blood pressure log to follow-up appointment with Dr. Wayne for blood pressure medication adjustment.    Discussed smoking cessation.  Urine drug screen positive for methamphetamines.  Discussed with patient to discontinue.    Patient admits to drinking 2 whiskey drinks daily.  No signs of shaking or alcohol withdrawal.  Discussed alcohol cessation.  Prophylactic folic acid and thiamine ordered.    SCDs ordered for deep vein thrombosis prophylaxis.    On 1/17/2024, he has been evaluated by neurology today.  Right arm and right leg are stronger.  He was evaluated by physical therapy who recommended continued therapy but unfortunately, patient moved from [place] to this area 4 months ago and has no health insurance.  Social service has provided assistance with Medicaid.  Per neurology aspirin 81 mg orally daily, Lipitor 80 mg orally nightly for LDL goal less than 70.  Systolic blood pressure goal less than 140.  Patient has been counseled to purchase blood pressure cuff and monitor blood pressures outpatient and keep a log for primary care provider.  Discussed smoking cessation and illicit drug use cessation.  Discussed alcohol cessation.  Patient will follow-up with neurology in 4 weeks, 2/20/2024.  We have arranged follow-up with Dr. Talib Wayne on 1/22/2024 to establish care as new patient.  Meds to beds ordered.  Patient has been advised not to drive secondary to right lower extremity weakness and no climbing on ladders or stepstools or other activities as he is at fall risk.     Physical Exam on Discharge:  /72 (BP Location: Right arm, Patient Position: Lying)   Pulse 73   Temp 97.5 °F (36.4 °C) (Oral)   Resp 18   Ht 162.6 cm (64\")   Wt 74.8 kg (165 lb)   SpO2 97%   BMI 28.32 " kg/m²   Physical Exam  Vitals and nursing note reviewed.   HENT:      Head: Normocephalic and atraumatic.      Nose: No congestion.      Mouth/Throat:      Pharynx: Oropharynx is clear. No oropharyngeal exudate or posterior oropharyngeal erythema.   Eyes:      Extraocular Movements: Extraocular movements intact.      Pupils: Pupils are equal, round, and reactive to light.   Cardiovascular:      Rate and Rhythm: Normal rate and regular rhythm.      Heart sounds: No murmur heard.  Pulmonary:      Breath sounds: No wheezing, rhonchi or rales.      Comments: No oxygen use  Abdominal:      Palpations: Abdomen is soft.      Tenderness: There is no abdominal tenderness.   Genitourinary:     Comments: Voiding.  Musculoskeletal:         General: No swelling or tenderness.      Cervical back: Normal range of motion and neck supple.   Skin:     General: Skin is warm and dry.   Neurological:      Mental Status: He is alert and oriented to person, place, and time.      Comments: Right upper extremity weakness improved.  Finger to nose improved.  Slight uncoordinated.  Right lower extremity weak but able to ambulate with walker and picking foot up.  Mild right foot drop.   Psychiatric:         Mood and Affect: Mood normal.         Behavior: Behavior normal.         Thought Content: Thought content normal.         Judgment: Judgment normal.       Condition on Discharge: Stable for discharge home with family    Discharge Disposition:  Home or Self Care    Discharge Medications:     Discharge Medications        New Medications        Instructions Start Date   aspirin 81 MG chewable tablet   81 mg, Oral, Daily   Start Date: January 18, 2024     atorvastatin 80 MG tablet  Commonly known as: LIPITOR   80 mg, Oral, Nightly      losartan 25 MG tablet  Commonly known as: COZAAR   25 mg, Oral, Every 24 Hours Scheduled   Start Date: January 18, 2024              Discharge Diet:   Diet Instructions       Diet: Regular/House Diet; Regular  Texture (IDDSI 7); Thin (IDDSI 0)      Discharge Diet: Regular/House Diet    Texture: Regular Texture (IDDSI 7)    Fluid Consistency: Thin (IDDSI 0)          Activity at Discharge:   Activity Instructions       Other Activity Instructions      Activity Instructions: Patient not drive due to RLE weakness and no climbing on ladders or step stools and other activities as he is fall risk.          Discharge instructions:  1.  For worsening right-sided weakness seek medical attention  2.  Aspirin 81 mg orally daily  3.  Atorvastatin 80 mg orally nightly  4.  Losartan 25 mg orally daily  5.  Follow-up with neurology 4 weeks, 2/20/2024  6.  Follow-up with Dr. Talib Wayne 1/22/2024 1 week to establish care as new patient.  7.  Walker for safety.  8.  No climbing, stepstools or ladders at risk for fall per neurology.  No driving due to right lower extremity weakness.  No driving until cleared by neurology.    Follow-up Appointments:   Future Appointments   Date Time Provider Department Center   1/22/2024  2:00 PM NATIVIDAD Wayne MD [facility]   2/20/2024  2:30 PM Harris Flores APRN [facility]       Test Results Pending at Discharge: None    Electronically signed by ALLYSON Fregoso, 01/17/24, 09:43 CST.    Time: 35 minutes.  Discussed with Dr. Petty, Mary Lama, ALLYSON neurology, and patient.    The above documentation resulted from a face-to-face encounter by me Aanly VALADEZ, [facility].           Electronically signed by Analy Hinkle APRN at 01/17/24 0944

## 2024-01-18 NOTE — OUTREACH NOTE
Call Center TCM Note      Flowsheet Row Responses   [facility] facility patient discharged from? [place]   Does the patient have one of the following disease processes/diagnoses(primary or secondary)? Stroke   TCM attempt successful? No   Unsuccessful attempts Attempt 1            Velia Butt RN    1/18/2024, 13:43 CST

## 2024-01-18 NOTE — OUTREACH NOTE
Prep Survey      Flowsheet Row Responses   [facility] patient discharged from? [place]   Is LACE score < 7 ? Yes   Eligibility [facility]   Date of Admission 01/15/24   Date of Discharge 01/17/24   Discharge Disposition Home or Self Care   Discharge diagnosis Acute stroke of right basal ganglia   Does the patient have one of the following disease processes/diagnoses(primary or secondary)? Stroke   Does the patient have Home health ordered? No   Is there a DME ordered? Yes   What DME was ordered? rolling walker from Medicare   Comments regarding appointments new PCP appt   Prep survey completed? Yes            Robina CARPENTER - Registered Nurse

## 2024-01-19 ENCOUNTER — TRANSITIONAL CARE MANAGEMENT TELEPHONE ENCOUNTER (OUTPATIENT)
Dept: CALL CENTER | Facility: HOSPITAL | Age: 54
End: 2024-01-19
Payer: MEDICAID

## 2024-01-19 NOTE — OUTREACH NOTE
Call Center TCM Note      Flowsheet Row Responses   [facility] facility patient discharged from? [place]   Does the patient have one of the following disease processes/diagnoses(primary or secondary)? Stroke   TCM attempt successful? No   Unsuccessful attempts Attempt 3            Velia Butt RN    1/19/2024, 10:46 CST

## 2024-01-22 ENCOUNTER — OFFICE VISIT (OUTPATIENT)
Dept: INTERNAL MEDICINE | Facility: CLINIC | Age: 54
End: 2024-01-22
Payer: MEDICAID

## 2024-01-22 VITALS
HEART RATE: 68 BPM | BODY MASS INDEX: 27.37 KG/M2 | TEMPERATURE: 97.2 F | WEIGHT: 160.3 LBS | SYSTOLIC BLOOD PRESSURE: 134 MMHG | HEIGHT: 64 IN | DIASTOLIC BLOOD PRESSURE: 90 MMHG | OXYGEN SATURATION: 99 %

## 2024-01-22 DIAGNOSIS — Z72.0 TOBACCO USE: ICD-10-CM

## 2024-01-22 DIAGNOSIS — E78.2 MIXED HYPERLIPIDEMIA: ICD-10-CM

## 2024-01-22 DIAGNOSIS — Z86.73 HISTORY OF STROKE: Primary | ICD-10-CM

## 2024-01-22 DIAGNOSIS — F15.10 METHAMPHETAMINE USE: ICD-10-CM

## 2024-01-22 DIAGNOSIS — I10 PRIMARY HYPERTENSION: ICD-10-CM

## 2024-01-22 DIAGNOSIS — R53.1 RIGHT SIDED WEAKNESS: ICD-10-CM

## 2024-01-22 PROBLEM — I63.9 ACUTE STROKE OF BASAL GANGLIA: Status: RESOLVED | Noted: 2024-01-15 | Resolved: 2024-01-22

## 2024-01-22 PROCEDURE — 1159F MED LIST DOCD IN RCRD: CPT | Performed by: INTERNAL MEDICINE

## 2024-01-22 PROCEDURE — 3075F SYST BP GE 130 - 139MM HG: CPT | Performed by: INTERNAL MEDICINE

## 2024-01-22 PROCEDURE — 99204 OFFICE O/P NEW MOD 45 MIN: CPT | Performed by: INTERNAL MEDICINE

## 2024-01-22 PROCEDURE — 1160F RVW MEDS BY RX/DR IN RCRD: CPT | Performed by: INTERNAL MEDICINE

## 2024-01-22 PROCEDURE — 3080F DIAST BP >= 90 MM HG: CPT | Performed by: INTERNAL MEDICINE

## 2024-01-22 RX ORDER — ATORVASTATIN CALCIUM 80 MG/1
80 TABLET, FILM COATED ORAL NIGHTLY
Qty: 90 TABLET | Refills: 3 | Status: SHIPPED | OUTPATIENT
Start: 2024-01-22

## 2024-01-22 RX ORDER — LOSARTAN POTASSIUM 50 MG/1
50 TABLET ORAL
Qty: 90 TABLET | Refills: 1 | Status: SHIPPED | OUTPATIENT
Start: 2024-01-22

## 2024-01-22 RX ORDER — ASPIRIN 81 MG/1
81 TABLET, CHEWABLE ORAL DAILY
Qty: 90 TABLET | Refills: 3 | Status: SHIPPED | OUTPATIENT
Start: 2024-01-22

## 2024-01-22 NOTE — PROGRESS NOTES
Chief Complaint   Patient presents with    [facility]     Hospital follow-up for stroke    Hypertension         History:  Jermaine Howard is a 53 y.o. male who presents today for evaluation of the above problems.      Jermaine presents today to [facility].  He was admitted to the hospital recently for an acute stroke of the right basal ganglia in the setting of hypertension, tobacco use, alcohol use and methamphetamine use.  He was admitted to the neurology floor.  Echo showed EF of 51 to 55% without evidence of PFO.  CTA of the carotid arteries showed a mild left ICA calcification without flow-limiting stenosis.  He was recommended to continue aspirin, atorvastatin per neurology.  A Zio patch was placed upon discharge.  UDS was positive for amphetamines.  He had right upper extremity and right lower extremity weakness on evaluation physical therapy.  He was walking with a rolling walker and was discharged in improved condition with continued physical therapy on an outpatient basis.  However, he did not have any source of income so there was no therapy upon discharge.    He is still weak on the right side but is feeling stronger.  He is able to write his name and hold a cup with his right hand now.  He is still needing to walk with a walker, however.    He states that he he does not use methamphetamine on a regular basis but was using it every now and then.  He was a more regular user in the past.  However, he has stopped this.    He has decreased his cigarette intake to 10 cigarettes/day.  He has a 23-pack-year history of smoking.    HPI    Social History     Socioeconomic History    Marital status: Legally    Tobacco Use    Smoking status: Every Day     Packs/day: 1.00     Years: 23.00     Additional pack years: 0.00     Total pack years: 23.00     Types: Cigarettes   Vaping Use    Vaping Use: Never used   Substance and Sexual Activity    Alcohol use: Yes     Comment: Sha -- everyday    Sexual  activity: Yes     Partners: Female       ROS:  Review of Systems   Respiratory: Negative.     Cardiovascular: Negative.    Gastrointestinal: Negative.    Musculoskeletal:  Positive for gait problem (Improved).   Neurological:  Positive for weakness (Improving). Negative for dizziness, syncope, facial asymmetry, speech difficulty, light-headedness, numbness and headaches.         Current Outpatient Medications:     aspirin 81 MG chewable tablet, Chew 1 tablet Daily., Disp: 90 tablet, Rfl: 3    atorvastatin (LIPITOR) 80 MG tablet, Take 1 tablet by mouth Every Night., Disp: 90 tablet, Rfl: 3    losartan (COZAAR) 50 MG tablet, Take 1 tablet by mouth Daily., Disp: 90 tablet, Rfl: 1    Lab Results   Component Value Date    GLUCOSE 101 (H) 01/17/2024    BUN 9 01/17/2024    CREATININE 0.81 01/17/2024    EGFR 105.4 01/17/2024    BCR 11.1 01/17/2024    K 3.6 01/17/2024    CO2 24.0 01/17/2024    CALCIUM 8.6 01/17/2024    ALBUMIN 3.8 01/17/2024    BILITOT 0.4 01/17/2024    AST 18 01/17/2024    ALT 11 01/17/2024       WBC   Date Value Ref Range Status   01/17/2024 6.47 3.40 - 10.80 10*3/mm3 Final     RBC   Date Value Ref Range Status   01/17/2024 5.11 4.14 - 5.80 10*6/mm3 Final     Hemoglobin   Date Value Ref Range Status   01/17/2024 15.5 13.0 - 17.7 g/dL Final     Hematocrit   Date Value Ref Range Status   01/17/2024 45.6 37.5 - 51.0 % Final     MCV   Date Value Ref Range Status   01/17/2024 89.2 79.0 - 97.0 fL Final     MCH   Date Value Ref Range Status   01/17/2024 30.3 26.6 - 33.0 pg Final     MCHC   Date Value Ref Range Status   01/17/2024 34.0 31.5 - 35.7 g/dL Final     RDW   Date Value Ref Range Status   01/17/2024 12.4 12.3 - 15.4 % Final     RDW-SD   Date Value Ref Range Status   01/17/2024 41.1 37.0 - 54.0 fl Final     MPV   Date Value Ref Range Status   01/17/2024 11.0 6.0 - 12.0 fL Final     Platelets   Date Value Ref Range Status   01/17/2024 214 140 - 450 10*3/mm3 Final     Neutrophil %   Date Value Ref Range  "Status   01/15/2024 59.8 42.7 - 76.0 % Final     Lymphocyte %   Date Value Ref Range Status   01/15/2024 29.0 19.6 - 45.3 % Final     Monocyte %   Date Value Ref Range Status   01/15/2024 8.4 5.0 - 12.0 % Final     Eosinophil %   Date Value Ref Range Status   01/15/2024 1.8 0.3 - 6.2 % Final     Basophil %   Date Value Ref Range Status   01/15/2024 0.8 0.0 - 1.5 % Final     Immature Grans %   Date Value Ref Range Status   01/15/2024 0.2 0.0 - 0.5 % Final     Neutrophils, Absolute   Date Value Ref Range Status   01/15/2024 3.98 1.70 - 7.00 10*3/mm3 Final     Lymphocytes, Absolute   Date Value Ref Range Status   01/15/2024 1.93 0.70 - 3.10 10*3/mm3 Final     Monocytes, Absolute   Date Value Ref Range Status   01/15/2024 0.56 0.10 - 0.90 10*3/mm3 Final     Eosinophils, Absolute   Date Value Ref Range Status   01/15/2024 0.12 0.00 - 0.40 10*3/mm3 Final     Basophils, Absolute   Date Value Ref Range Status   01/15/2024 0.05 0.00 - 0.20 10*3/mm3 Final     Immature Grans, Absolute   Date Value Ref Range Status   01/15/2024 0.01 0.00 - 0.05 10*3/mm3 Final     nRBC   Date Value Ref Range Status   01/15/2024 0.0 0.0 - 0.2 /100 WBC Final         OBJECTIVE:  Visit Vitals  /90 (BP Location: Left arm, Patient Position: Sitting, Cuff Size: Adult)   Pulse 68   Temp 97.2 °F (36.2 °C) (Temporal)   Ht 162.6 cm (64\")   Wt 72.7 kg (160 lb 4.8 oz)   SpO2 99%   BMI 27.52 kg/m²      Physical Exam  Vitals and nursing note reviewed.   Constitutional:       General: He is not in acute distress.     Appearance: Normal appearance. He is well-developed. He is not ill-appearing or toxic-appearing.   HENT:      Head: Normocephalic and atraumatic.   Eyes:      Pupils: Pupils are equal, round, and reactive to light.   Neck:      Thyroid: No thyromegaly.      Vascular: No carotid bruit.      Trachea: Phonation normal.   Cardiovascular:      Rate and Rhythm: Normal rate and regular rhythm.      Heart sounds: No murmur heard.  Pulmonary:      " Effort: Pulmonary effort is normal. No respiratory distress.      Breath sounds: Normal breath sounds. No wheezing, rhonchi or rales.   Abdominal:      Tenderness: There is no abdominal tenderness.   Musculoskeletal:      Right lower leg: No edema.      Left lower leg: No edema.   Skin:     Coloration: Skin is not pale.      Findings: No erythema.   Neurological:      Mental Status: He is alert.      Comments: 4 out of 5 strength in the right upper extremity and lower extremity.  There is 5 out of 5 strength in the left upper and left lower extremities.  Walking with a walker   Psychiatric:         Behavior: Behavior normal.         Thought Content: Thought content normal.         Judgment: Judgment normal.         Assessment/Plan    Diagnoses and all orders for this visit:    1. History of stroke (Primary)  -     Ambulatory Referral to Physical Therapy Evaluate and treat  -     atorvastatin (LIPITOR) 80 MG tablet; Take 1 tablet by mouth Every Night.  Dispense: 90 tablet; Refill: 3  -     aspirin 81 MG chewable tablet; Chew 1 tablet Daily.  Dispense: 90 tablet; Refill: 3    2. Primary hypertension  -     losartan (COZAAR) 50 MG tablet; Take 1 tablet by mouth Daily.  Dispense: 90 tablet; Refill: 1    3. Mixed hyperlipidemia  -     atorvastatin (LIPITOR) 80 MG tablet; Take 1 tablet by mouth Every Night.  Dispense: 90 tablet; Refill: 3    4. Methamphetamine use    5. Tobacco use    6. Right sided weakness  -     Ambulatory Referral to Physical Therapy Evaluate and treat      Overall, Jermaine is improving from his recent hospitalization for stroke.  He is still weak on the right side and I am going to refer him to physical therapy.    He was instructed to continue taking all of his medications as above.  He understands that these will be lifelong medications.    His blood pressure is above our goal, and I am increasing his losartan to 50 mg daily.    Recommend continued efforts at tobacco cessation    Recommend complete  methamphetamine use cessation.  He understands that 1 more use could lead to another stroke.    Follow-up in 3 months for his annual physical.  We will get follow-up blood work on his CMP and lipid panel at that time.  Return in about 3 months (around 4/22/2024) for Annual physical.      NIKOLAS Wayne MD  14:04 CST  1/22/2024   Electronically signed

## 2024-01-26 ENCOUNTER — PATIENT ROUNDING (BHMG ONLY) (OUTPATIENT)
Dept: INTERNAL MEDICINE | Facility: CLINIC | Age: 54
End: 2024-01-26
Payer: MEDICAID

## 2024-01-26 NOTE — PROGRESS NOTES
January 26, 2024    Hello, may I speak with Jermaine Howard?    My name is  HEATHER PRINCE      I am  with [facility]  [address], [address]  [place] [address] [phone].    Before we get started may I verify your date of birth? 1970    I am calling to officially welcome you to our practice and ask about your recent visit. Is this a good time to talk? yes    Tell me about your visit with us. What things went well?  GOOD       We're always looking for ways to make our patients' experiences even better. Do you have recommendations on ways we may improve?  no    Overall were you satisfied with your first visit to our practice? yes       I appreciate you taking the time to speak with me today. Is there anything else I can do for you? no      Thank you, and have a great day.

## 2024-01-29 ENCOUNTER — TREATMENT (OUTPATIENT)
Dept: PHYSICAL THERAPY | Facility: CLINIC | Age: 54
End: 2024-01-29
Payer: MEDICAID

## 2024-01-29 DIAGNOSIS — R53.1 RIGHT SIDED WEAKNESS: ICD-10-CM

## 2024-01-29 DIAGNOSIS — Z86.73 HISTORY OF STROKE: Primary | ICD-10-CM

## 2024-01-29 NOTE — PROGRESS NOTES
"                                                          Physical Therapy Initial Evaluation and Plan of Care  [address] [place] [address]    Patient: Jermaine Howard   : 1970  Referring practitioner: NATIVIDAD Wayne MD  Date of Initial Visit: 2024  Today's Date: 2024  Patient seen for 1 sessions    Visit Diagnoses:    ICD-10-CM ICD-9-CM   1. History of stroke  Z86.73 V12.54   2. Right sided weakness  R53.1 728.87     Past Medical History:   Diagnosis Date    Acute stroke of right basal ganglia 01/15/2024    Hyperlipidemia     Hypertension     Stroke      No past surgical history on file.      SUBJECTIVE     Subjective Evaluation    History of Present Illness  Mechanism of injury: He feels back to normal in his arms, legs may be a little weak. He denies falls, dizziness, difficulty w/ ADLs, n/t. He feels like he is back to where he was. He has a f/u in Feb and April.    Per ED note on 1/15/2024:    \"Hospital Course: Mr. Howard presented to [facility] emergency room 1/15/2024 reporting unable to stand with right arm and right lower extremity weakness.  Patient reported 2024 he was working on remodeling home and noted right arm and right leg weakness around 2300 while in the shower.  He reported sudden onset of right leg weakness and fell to the floor.  He reported difficulty getting up.  In addition, he noted right arm weakness.  He denied speech difficulty, facial weakness or altered speech.  Reported initially he was having to use his left hand to move his right arm.  On examination he was noted to have right upper extremity right lower extremity weakness with discoordination of the right upper extremity.  CT head and neck no acute intracranial abnormality.  X-ray pelvis no acute abnormality.  CT cervical spine no acute bony abnormality.  Normal saline fluid bolus, folic acid, aspirin, statin given in ER.     He was admitted to the neurology floor with acute right basal " ganglia stroke.  He presented with right arm, right leg weakness.  MRI positive for right basal ganglia stroke.  Echo showed EF 51-55% with no PFO.  No evidence of atrial septal defect.  Saline test negative for right-to-left shunt.  CTA carotids noted mild left ICA calcified plaque with no flow-limiting stenosis.  No arterial thrombus.  CTA head noted intact [place].  No intracranial arterial abnormality seen.  Aspirin, statin started on admission.     Neurology consulted and he was seen by Dr. Tae Marino with impression acute right basal ganglia stroke likely lacunar stroke with small vessel disease.  Continue aspirin, atorvastatin.  Zio patch at discharge.  SBP goal less than 140.  Urine drug screen positive for methamphetamines and amphetamines.     Physical therapy, Occupational Therapy, speech therapy consulted.  Physical therapy noted right upper extremity, right lower extremity weakness.  Fatigues quickly with ambulation due to right toe drag due to right foot drop.  Patient able to compensate for 30 feet.  He used rolling walker to ambulate safely.  Patient educated on proper gait mechanics to progress his ambulation safely and coordination.  Patient would benefit from continued physical therapy to work as an outpatient but patient has no source of income for further therapy after discharge.     LDL 95 with LDL goal less than 70.  Lipitor 80 mg orally nightly ordered.  Hemoglobin A1c 5.6.     Patient presented with hypertension.  Blood pressure 172/100.  Blood pressure trended down to 144/79.  Per neurology, systolic blood pressure goal less than 140 now that patient 3 to 4 days from onset of symptoms.  Losartan 25 mg orally daily started.  Blood pressure 138/72 at discharge.  Patient has been advised to purchase blood pressure cuff and monitor blood pressure to take blood pressure log to follow-up appointment with Dr. Wayne for blood pressure medication adjustment.     Discussed smoking  "cessation.  Urine drug screen positive for methamphetamines.  Discussed with patient to discontinue.     Patient admits to drinking 2 whiskey drinks daily.  No signs of shaking or alcohol withdrawal.  Discussed alcohol cessation.  Prophylactic folic acid and thiamine ordered.     SCDs ordered for deep vein thrombosis prophylaxis.     On 1/17/2024, he has been evaluated by neurology today.  Right arm and right leg are stronger.  He was evaluated by physical therapy who recommended continued therapy but unfortunately, patient moved from [place] to this area 4 months ago and has no health insurance.  Social service has provided assistance with Medicaid.  Per neurology aspirin 81 mg orally daily, Lipitor 80 mg orally nightly for LDL goal less than 70.  Systolic blood pressure goal less than 140.  Patient has been counseled to purchase blood pressure cuff and monitor blood pressures outpatient and keep a log for primary care provider.  Discussed smoking cessation and illicit drug use cessation.  Discussed alcohol cessation.  Patient will follow-up with neurology in 4 weeks, 2/20/2024.  We have arranged follow-up with Dr. Talib Wayne on 1/22/2024 to establish care as new patient.  Meds to beds ordered.  Patient has been advised not to drive secondary to right lower extremity weakness and no climbing on ladders or stepstools or other activities as he is at fall risk.\"             PT G-Codes  Outcome Measure Options: Beverly Balance  Beverly Total Score: 52    OBJECTIVE     Objective          Ambulation     Ambulation: Stairs   Ascend stairs: independent  Pattern: reciprocal  Railings: one rail  Descend stairs: independent  Pattern: non-reciprocal  Railings: one rail    Observational Gait   Gait: asymmetric   Decreased walking speed and right stance time.   Walking speed comments: see 6 MWT    Quality of Movement During Gait   Trunk    Trunk (Left): Positive left lateral lean over stance limb.     Hip    Hip (Right): " Positive circumducted.     Knee    Knee (Right): Positive quad avoidance.     Additional Quality of Movement During Gait Details  Lateral lurch and gait efficiency improves w/ SPC in LUE        Sensation: no sensory deficits noted, normal proprioception, normal heel to shin, normal dysdiadochokinesia    BALANCE TESTING  6 MWT: 834' no AD  TU.86 secs, 14.46 secs CDT no AD  NIX/56    miniBEST:   2  2  1  1  1  1  2  1  2  1  2  2  1  0 (8.86, 14.46)    LE MMT   HIP Strength L Strength R   flexion 5  5   extension 4+  4+   ABD 5  5   KNEE     flexion 5  5   extension 5  5   ANKLE     dorsiflexion 5  5    *pain    Therapy Education/Self Care [id number]   Education offered today  POC, anatomy, implications of functional test results, HEP below, use of SPC in LUE vs FWW   [employer] Code [id number]   Ongoing HEP     Date: 2024  Prepared by: Autumn Manuel    Exercises  - Romberg Stance with Head Nods  - 2-3 x daily - 4 x weekly - 2 sets - 2 reps - 60 seconds  - Tandem Stance  - 2-3 x daily - 4 x weekly - 2 sets - 2 reps - 60 seconds  - Standing Single Leg Stance with Counter Support  - 2-3 x daily - 4 x weekly - 2 sets - 2 reps - 30 seconds  - Alternating Step Taps with Counter Support  - 2-3 x daily - 3-4 x weekly - 2 sets - 10 reps  - Forward Step Down Touch with Toe  - 2-3 x daily - 3-4 x weekly - 2 sets - 10 reps   Timed Minutes 15       Total Timed Treatment:     15   mins  Total Time of Visit:            60   mins    ASSESSMENT/PLAN     GOALS:  Goals                                                    Progress Note due by 2024                                                                Recert due by 2024    Comments Date Status   LTG by: 12 weeks       Patient will be I with final HEP.        Patient to improve NIX balance score to >/= 56/56 to decrease patient's risk of falls.      Patient to perform CDT TUG within 8 sec without LOB for improved functional mobility.      Patient to ambulate  >/=1877' (male 60-70 yo norms) with LRAD during 6 MWT without LOB and optimal gait mechanics for improved gait wayne/efficiency and functional mobility.       Patient to improve mini-BEST test balance score to >/= 26/28 to decrease patient's risk of falls.              Assessment & Plan       Assessment  Impairments: abnormal coordination, abnormal gait, abnormal muscle firing, abnormal muscle tone, abnormal or restricted ROM, activity intolerance, impaired balance, impaired physical strength, lacks appropriate home exercise program, pain with function and safety issue   Functional limitations: carrying objects, lifting, sleeping, walking, pulling, pushing, uncomfortable because of pain, moving in bed, sitting, standing, stooping, reaching behind back, reaching overhead and unable to perform repetitive tasks   Assessment details: Jermaine Howard is a 53 year old male who presents w/ impaired functional mobility secondary to medical events detailed per chart review and pt subjective report. The pt exhibits LLE < RLE weakness w/ functional testing; impaired static/dynamic standing balance as evidenced by Beverly Balance, miniBEST, and TUG results; and impaired activity tolerance evidenced by deteriorating gait w/ 6 MWT w/ distance below 60/70 yo male norms. Pt was independent w/ all mobility tasks w/o AD use prior to this episode of care though currently requires use of SPC and physical assist for safety. Due to the aforementioned anatomical and functional limitations, the pt will require skilled OP PT services to optimize functional recovery, safety, and independence.  Prognosis: good    Plan  Therapy options: will be seen for skilled therapy services  Planned modality interventions: thermotherapy (hydrocollator packs), cryotherapy, low level laser therapy, TENS, dry needling, electrical stimulation/Ecuadorean stimulation and ultrasound  Planned therapy interventions: abdominal trunk stabilization, manual therapy, ADL  retraining, motor coordination training, balance/weight-bearing training, neuromuscular re-education, body mechanics training, postural training, soft tissue mobilization, spinal/joint mobilization, fine motor coordination training, flexibility, functional ROM exercises, strengthening, gait training, stretching, home exercise program, therapeutic activities, IADL retraining, joint mobilization and transfer training  Duration in weeks: 12  Treatment plan discussed with: patient  Plan details: Patient will be seen 1x bi-weekly x 12wks with treatment to include strengthening, stretching, manual therapy, neuromuscular re-education, balance, gait and endurance training.          SIGNATURE: Autumn Manuel PT DPT, [place] License #: [id number]  Electronically Signed on 1/29/2024    Initial Certification  Certification Period: 1/29/2024 through 4/27/2024  I certify that the therapy services are furnished while this patient is under my care.  The services outlined above are required by this patient, and will be reviewed every 90 days.     PHYSICIAN: NATIVIDAD Wayne MD (NPI: [id number])    Signature: _______________________________________DATE: _________    Please sign and return via fax to [phone].   Thank you so much for letting us work with Jermaine. I appreciate your letting us work with your patients. If you have any questions or concerns, please don't hesitate to contact me.          [address] [id number]  [phone]

## 2024-01-31 PROBLEM — I63.9 LEFT BASAL GANGLIA EMBOLIC STROKE: Status: ACTIVE | Noted: 2024-01-31

## 2024-01-31 NOTE — PROGRESS NOTES
"Enter Query Response Below      Query Response: this was an acute left basal ganglia stroke             If applicable, please update the problem list.     Patient: Jermaine Howard        : 1970  Account: [id number]           Admit Date: 1/15/2024        How to Respond to this query:       a. Click New Note     b. Answer query within the yellow box.                c. Update the Problem List, if applicable.      If you have any questions about this query contact me at: [email or web address]    Dear ALLYSON Marc    Patient admitted with right basal ganglia stroke.  Documentation of right upper and lower extremity weakness, right foot drop.  Physical therapy note \" R UE shoulder weakness and moderate L LE weakness with ataxia of both\".    Please clarify the following:  Right upper and lower extremity weakness due to stroke  Right upper and lower extremity weakness due to_____________  Other_____________  Unable to clinically determine     By submitting this query, we are merely seeking further clarification of documentation to accurately reflect all conditions that you are monitoring, evaluating, treating or that extend the hospitalization or utilize additional resources of care. Please utilize your independent clinical judgment when addressing the question(s) above.     This query and your response, once completed, will be entered into the legal medical record.    Sincerely,  Evelia Ko RN BSN  Clinical Documentation Integrity Program   "

## 2024-02-13 ENCOUNTER — TREATMENT (OUTPATIENT)
Dept: PHYSICAL THERAPY | Facility: CLINIC | Age: 54
End: 2024-02-13
Payer: MEDICAID

## 2024-02-13 DIAGNOSIS — Z86.73 HISTORY OF STROKE: Primary | ICD-10-CM

## 2024-02-13 DIAGNOSIS — R53.1 RIGHT SIDED WEAKNESS: ICD-10-CM

## 2024-02-19 ENCOUNTER — TELEPHONE (OUTPATIENT)
Dept: NEUROLOGY | Facility: CLINIC | Age: 54
End: 2024-02-19
Payer: MEDICAID

## 2024-04-24 ENCOUNTER — TELEPHONE (OUTPATIENT)
Dept: INTERNAL MEDICINE | Facility: CLINIC | Age: 54
End: 2024-04-24

## 2024-04-24 NOTE — TELEPHONE ENCOUNTER
letter sent / unable to call regarding rescheduling or no show policy phone number is disconnected